# Patient Record
Sex: MALE | Race: WHITE | NOT HISPANIC OR LATINO | Employment: OTHER | URBAN - METROPOLITAN AREA
[De-identification: names, ages, dates, MRNs, and addresses within clinical notes are randomized per-mention and may not be internally consistent; named-entity substitution may affect disease eponyms.]

---

## 2020-06-22 ENCOUNTER — HOSPITAL ENCOUNTER (EMERGENCY)
Facility: HOSPITAL | Age: 30
Discharge: HOME | End: 2020-06-22
Attending: SURGERY
Payer: MEDICAID

## 2020-06-22 VITALS
RESPIRATION RATE: 16 BRPM | WEIGHT: 125 LBS | OXYGEN SATURATION: 98 % | SYSTOLIC BLOOD PRESSURE: 116 MMHG | BODY MASS INDEX: 21.46 KG/M2 | DIASTOLIC BLOOD PRESSURE: 64 MMHG | HEART RATE: 87 BPM | TEMPERATURE: 98 F

## 2020-06-22 DIAGNOSIS — T63.694A MARINE ANIMAL STING, UNDETERMINED INTENT, INITIAL ENCOUNTER: Primary | ICD-10-CM

## 2020-06-22 PROCEDURE — 96372 THER/PROPH/DIAG INJ SC/IM: CPT

## 2020-06-22 PROCEDURE — 25000003 PHARM REV CODE 250: Performed by: SURGERY

## 2020-06-22 PROCEDURE — 99284 EMERGENCY DEPT VISIT MOD MDM: CPT | Mod: 25

## 2020-06-22 RX ORDER — CLINDAMYCIN HYDROCHLORIDE 300 MG/1
300 CAPSULE ORAL 4 TIMES DAILY
Qty: 28 CAPSULE | Refills: 0 | Status: SHIPPED | OUTPATIENT
Start: 2020-06-22 | End: 2020-06-29

## 2020-06-22 RX ORDER — CIPROFLOXACIN 500 MG/1
500 TABLET ORAL 2 TIMES DAILY
Qty: 14 TABLET | Refills: 0 | Status: SHIPPED | OUTPATIENT
Start: 2020-06-22 | End: 2020-06-29

## 2020-06-22 RX ORDER — DOXYCYCLINE 100 MG/1
100 CAPSULE ORAL 2 TIMES DAILY
Qty: 20 CAPSULE | Refills: 0 | Status: SHIPPED | OUTPATIENT
Start: 2020-06-22 | End: 2020-07-02

## 2020-06-22 RX ORDER — MUPIROCIN 20 MG/G
1 OINTMENT TOPICAL
Status: COMPLETED | OUTPATIENT
Start: 2020-06-22 | End: 2020-06-22

## 2020-06-22 RX ORDER — MUPIROCIN 20 MG/G
OINTMENT TOPICAL 3 TIMES DAILY
Qty: 15 G | Refills: 0 | Status: SHIPPED | OUTPATIENT
Start: 2020-06-22 | End: 2020-07-02

## 2020-06-22 RX ORDER — CLINDAMYCIN PHOSPHATE 150 MG/ML
600 INJECTION, SOLUTION INTRAVENOUS
Status: COMPLETED | OUTPATIENT
Start: 2020-06-22 | End: 2020-06-22

## 2020-06-22 RX ADMIN — MUPIROCIN 22 G: 20 OINTMENT TOPICAL at 04:06

## 2020-06-22 RX ADMIN — CLINDAMYCIN PHOSPHATE 600 MG: 150 INJECTION, SOLUTION INTRAMUSCULAR; INTRAVENOUS at 04:06

## 2020-06-22 NOTE — ED PROVIDER NOTES
[facility]                                                 Chief Complaint  29 y.o. male with Finger Injury      History of Present Illness  Enriek Tao presents to the emergency room with CRAB INJURY today  Patient was injured by a crab claw 2 weeks ago on his left thenar eminence  Patient has had induration but no abscess or drainage or fever since that time  Patient on exam has an indurated area, non circumferential, no drainage now  Patient's tetanus status is up-to-date, patient has no systemic signs of infection    The history is provided by the patient   device was not used during this ER visit  Medical history: ADHD, anxiety, depression  Surgeries: Left ankle surgery  Allergies: Amoxicillin    I have reviewed all of this patient's past medical, surgical, family, and social   histories as well as active allergies and medications documented in the  electronic medical record    Review of Systems and Physical Exam      Review of Systems  -- Constitution - no fever, denies fatigue, no weakness, no chills  -- Eyes - no tearing or redness, no visual disturbance  -- Ear, Nose - no tinnitus or earache, no nasal congestion or discharge  -- Mouth,Throat - no sore throat, no toothache, normal voice, normal swallowing  -- Respiratory - denies cough and congestion, no shortness of breath, no NUNEZ  -- Cardiovascular - denies chest pain, no palpitations, denies claudication  -- Gastrointestinal - denies abdominal pain, nausea, vomiting, or diarrhea  -- Genitourinary - no dysuria, denies flank pain, no hematuria, no STD risk  -- Musculoskeletal - denies back pain, negative for trauma or injury  -- Neurological - no headache, denies weakness or seizure; no LOC  -- Skin - left hand infection    Vital Signs  His oral temperature is 98.4 °F (36.9 °C).   His blood pressure is 116/64 and his pulse is 87.   His respiration is 16 and oxygen saturation is 98%.     Physical Exam  -- Nursing  note and vitals reviewed  -- Constitutional: Appears well-developed and well-nourished  -- Head: Atraumatic. Normocephalic. No obvious abnormality  -- Eyes: Pupils are equal and reactive to light. Normal conjunctiva and lids  -- Cardiac: Normal rate, regular rhythm and normal heart sounds  -- Pulmonary: Normal respiratory effort, breath sounds clear to auscultation  -- Abdominal: Soft, no tenderness. Normal bowel sounds. Normal liver edge  -- Musculoskeletal: Normal range of motion, no effusions. Joints stable   -- Neurological: No focal deficits. Showed good interaction with staff  -- Vascular: Posterior tibial, dorsalis pedis and radial pulses 2+ bilaterally    -- Lymphatics: No cervical or peripheral lymphadenopathy. No edema noted  -- Skin: Mild induration to ventral left thenar eminence    Emergency Room Course          Medications Given  mupirocin 2 % ointment 22 g (22 g Topical (Top) Given 6/22/20 1636)   clindamycin injection 600 mg (600 mg Intramuscular Given 6/22/20 1636)     ED Physician Management  -- Diagnosis management comments: 29 y.o. male with Marine sting  -- patient was injured by a crab last month with residual induration  -- no foreign body on evaluation with no abscess noted today here  -- patient has been using over-the-counter ointment with induration  -- no fluctuance or drainage with no signs of cellulitic spread today  -- Cipro doxycycline and clindamycin use for antibiotic coverage  -- local wound care with Bactroban at least 3 to 4 times a day  -- follow-up with hand physician, return to the ER with any concerns    Diagnosis  [B68.041F] Marine animal sting    Disposition and Plan  -- Disposition: home  -- Condition: stable  -- Follow-up: Patient to follow up with MD in 1-2 days.  -- I advised the patient that we have found no life threatening condition today  -- At this time, I believe the patient is clinically stable for discharge.   -- The patient acknowledges that close follow up  with a MD is required   -- Patient agrees to comply with all instruction and direction given in the ER    This note is dictated on M*Modal word recognition program.  There are word recognition mistakes that are occasionally missed on review.         Misha Dimas MD  06/22/20 2424

## 2021-06-05 ENCOUNTER — HOSPITAL ENCOUNTER (EMERGENCY)
Facility: HOSPITAL | Age: 31
Discharge: HOME | End: 2021-06-05
Attending: SURGERY
Payer: MEDICAID

## 2021-06-05 VITALS
DIASTOLIC BLOOD PRESSURE: 76 MMHG | SYSTOLIC BLOOD PRESSURE: 137 MMHG | OXYGEN SATURATION: 99 % | BODY MASS INDEX: 21.46 KG/M2 | TEMPERATURE: 97 F | RESPIRATION RATE: 16 BRPM | HEART RATE: 78 BPM | WEIGHT: 125 LBS

## 2021-06-05 DIAGNOSIS — S67.02XA CRUSHING INJURY OF LEFT THUMB, INITIAL ENCOUNTER: Primary | ICD-10-CM

## 2021-06-05 PROCEDURE — 25000003 PHARM REV CODE 250: Performed by: SURGERY

## 2021-06-05 PROCEDURE — 29125 APPL SHORT ARM SPLINT STATIC: CPT | Mod: LT

## 2021-06-05 PROCEDURE — 99283 EMERGENCY DEPT VISIT LOW MDM: CPT | Mod: 25

## 2021-06-05 RX ORDER — IBUPROFEN 800 MG/1
800 TABLET ORAL EVERY 6 HOURS PRN
Qty: 20 TABLET | Refills: 0 | Status: ON HOLD | OUTPATIENT
Start: 2021-06-05 | End: 2022-06-19 | Stop reason: HOSPADM

## 2021-06-05 RX ORDER — ACETAMINOPHEN 500 MG
1000 TABLET ORAL
Status: COMPLETED | OUTPATIENT
Start: 2021-06-05 | End: 2021-06-05

## 2021-06-05 RX ORDER — IBUPROFEN 800 MG/1
800 TABLET ORAL
Status: COMPLETED | OUTPATIENT
Start: 2021-06-05 | End: 2021-06-05

## 2021-06-05 RX ADMIN — IBUPROFEN 800 MG: 800 TABLET, FILM COATED ORAL at 12:06

## 2021-06-05 RX ADMIN — ACETAMINOPHEN 1000 MG: 500 TABLET ORAL at 12:06

## 2022-06-18 ENCOUNTER — NURSE TRIAGE (OUTPATIENT)
Dept: ADMINISTRATIVE | Facility: CLINIC | Age: 32
End: 2022-06-18
Payer: MEDICAID

## 2022-06-18 ENCOUNTER — HOSPITAL ENCOUNTER (OUTPATIENT)
Facility: HOSPITAL | Age: 32
Discharge: LEFT BEFORE DISCHARGE | End: 2022-06-19
Attending: SURGERY | Admitting: STUDENT IN AN ORGANIZED HEALTH CARE EDUCATION/TRAINING PROGRAM
Payer: MEDICAID

## 2022-06-18 DIAGNOSIS — R11.2 NAUSEA AND VOMITING, INTRACTABILITY OF VOMITING NOT SPECIFIED, UNSPECIFIED VOMITING TYPE: ICD-10-CM

## 2022-06-18 DIAGNOSIS — I95.9 HYPOTENSION, UNSPECIFIED HYPOTENSION TYPE: ICD-10-CM

## 2022-06-18 DIAGNOSIS — A05.9 FOOD POISONING: ICD-10-CM

## 2022-06-18 DIAGNOSIS — K52.9 GASTROENTERITIS: Primary | ICD-10-CM

## 2022-06-18 DIAGNOSIS — U07.1 COVID: ICD-10-CM

## 2022-06-18 DIAGNOSIS — F12.10 MILD TETRAHYDROCANNABINOL (THC) ABUSE: ICD-10-CM

## 2022-06-18 LAB
ALBUMIN SERPL BCP-MCNC: 4.6 G/DL (ref 3.5–5.2)
ALP SERPL-CCNC: 76 U/L (ref 55–135)
ALT SERPL W/O P-5'-P-CCNC: 47 U/L (ref 10–44)
AMORPH CRY URNS QL MICRO: ABNORMAL
AMPHET+METHAMPHET UR QL: NEGATIVE
ANION GAP SERPL CALC-SCNC: 16 MMOL/L (ref 8–16)
AST SERPL-CCNC: 52 U/L (ref 10–40)
BACTERIA #/AREA URNS HPF: ABNORMAL /HPF
BARBITURATES UR QL SCN>200 NG/ML: NEGATIVE
BASOPHILS # BLD AUTO: 0.02 K/UL (ref 0–0.2)
BASOPHILS NFR BLD: 0.2 % (ref 0–1.9)
BENZODIAZ UR QL SCN>200 NG/ML: NEGATIVE
BILIRUB SERPL-MCNC: 0.4 MG/DL (ref 0.1–1)
BILIRUB UR QL STRIP: NEGATIVE
BUN SERPL-MCNC: 21 MG/DL (ref 6–20)
BZE UR QL SCN: NEGATIVE
CALCIUM SERPL-MCNC: 9.5 MG/DL (ref 8.7–10.5)
CANNABINOIDS UR QL SCN: ABNORMAL
CAOX CRY URNS QL MICRO: ABNORMAL
CHLORIDE SERPL-SCNC: 99 MMOL/L (ref 95–110)
CLARITY UR: CLEAR
CO2 SERPL-SCNC: 22 MMOL/L (ref 23–29)
COLOR UR: YELLOW
CREAT SERPL-MCNC: 1.6 MG/DL (ref 0.5–1.4)
CREAT UR-MCNC: 380.7 MG/DL (ref 23–375)
DIFFERENTIAL METHOD: ABNORMAL
EOSINOPHIL # BLD AUTO: 0.2 K/UL (ref 0–0.5)
EOSINOPHIL NFR BLD: 2.6 % (ref 0–8)
ERYTHROCYTE [DISTWIDTH] IN BLOOD BY AUTOMATED COUNT: 12 % (ref 11.5–14.5)
EST. GFR  (AFRICAN AMERICAN): >60 ML/MIN/1.73 M^2
EST. GFR  (NON AFRICAN AMERICAN): 56 ML/MIN/1.73 M^2
GLUCOSE SERPL-MCNC: 110 MG/DL (ref 70–110)
GLUCOSE UR QL STRIP: NEGATIVE
HCT VFR BLD AUTO: 51.4 % (ref 40–54)
HGB BLD-MCNC: 18.3 G/DL (ref 14–18)
HGB UR QL STRIP: ABNORMAL
HYALINE CASTS #/AREA URNS LPF: 100 /LPF
IMM GRANULOCYTES # BLD AUTO: 0.03 K/UL (ref 0–0.04)
IMM GRANULOCYTES NFR BLD AUTO: 0.4 % (ref 0–0.5)
KETONES UR QL STRIP: NEGATIVE
LACTATE SERPL-SCNC: 0.8 MMOL/L (ref 0.5–2.2)
LACTATE SERPL-SCNC: 1.4 MMOL/L (ref 0.5–2.2)
LEUKOCYTE ESTERASE UR QL STRIP: NEGATIVE
LIPASE SERPL-CCNC: 26 U/L (ref 4–60)
LYMPHOCYTES # BLD AUTO: 0.7 K/UL (ref 1–4.8)
LYMPHOCYTES NFR BLD: 8.4 % (ref 18–48)
MCH RBC QN AUTO: 31.7 PG (ref 27–31)
MCHC RBC AUTO-ENTMCNC: 35.6 G/DL (ref 32–36)
MCV RBC AUTO: 89 FL (ref 82–98)
METHADONE UR QL SCN>300 NG/ML: NEGATIVE
MICROSCOPIC COMMENT: ABNORMAL
MONOCYTES # BLD AUTO: 0.8 K/UL (ref 0.3–1)
MONOCYTES NFR BLD: 10.2 % (ref 4–15)
NEUTROPHILS # BLD AUTO: 6.3 K/UL (ref 1.8–7.7)
NEUTROPHILS NFR BLD: 78.2 % (ref 38–73)
NITRITE UR QL STRIP: NEGATIVE
NRBC BLD-RTO: 0 /100 WBC
OPIATES UR QL SCN: NEGATIVE
PCP UR QL SCN>25 NG/ML: NEGATIVE
PH UR STRIP: 6 [PH] (ref 5–8)
PLATELET # BLD AUTO: 168 K/UL (ref 150–450)
PMV BLD AUTO: 11 FL (ref 9.2–12.9)
POTASSIUM SERPL-SCNC: 3.8 MMOL/L (ref 3.5–5.1)
PROT SERPL-MCNC: 8.3 G/DL (ref 6–8.4)
PROT UR QL STRIP: ABNORMAL
RBC # BLD AUTO: 5.78 M/UL (ref 4.6–6.2)
RBC #/AREA URNS HPF: 3 /HPF (ref 0–4)
SODIUM SERPL-SCNC: 137 MMOL/L (ref 136–145)
SP GR UR STRIP: >=1.03 (ref 1–1.03)
TOXICOLOGY INFORMATION: ABNORMAL
URN SPEC COLLECT METH UR: ABNORMAL
UROBILINOGEN UR STRIP-ACNC: NEGATIVE EU/DL
WBC # BLD AUTO: 8.01 K/UL (ref 3.9–12.7)
WBC #/AREA URNS HPF: 6 /HPF (ref 0–5)

## 2022-06-18 PROCEDURE — 36415 COLL VENOUS BLD VENIPUNCTURE: CPT | Performed by: SURGERY

## 2022-06-18 PROCEDURE — 83605 ASSAY OF LACTIC ACID: CPT | Mod: 91 | Performed by: SURGERY

## 2022-06-18 PROCEDURE — 85025 COMPLETE CBC W/AUTO DIFF WBC: CPT | Performed by: SURGERY

## 2022-06-18 PROCEDURE — 63600175 PHARM REV CODE 636 W HCPCS: Performed by: SURGERY

## 2022-06-18 PROCEDURE — 96361 HYDRATE IV INFUSION ADD-ON: CPT

## 2022-06-18 PROCEDURE — 80053 COMPREHEN METABOLIC PANEL: CPT | Performed by: SURGERY

## 2022-06-18 PROCEDURE — 99285 EMERGENCY DEPT VISIT HI MDM: CPT | Mod: 25

## 2022-06-18 PROCEDURE — 83690 ASSAY OF LIPASE: CPT | Performed by: SURGERY

## 2022-06-18 PROCEDURE — 87040 BLOOD CULTURE FOR BACTERIA: CPT | Mod: 59 | Performed by: SURGERY

## 2022-06-18 PROCEDURE — G0378 HOSPITAL OBSERVATION PER HR: HCPCS

## 2022-06-18 PROCEDURE — 81000 URINALYSIS NONAUTO W/SCOPE: CPT | Mod: 59 | Performed by: SURGERY

## 2022-06-18 PROCEDURE — 25000003 PHARM REV CODE 250: Performed by: SURGERY

## 2022-06-18 PROCEDURE — 96374 THER/PROPH/DIAG INJ IV PUSH: CPT

## 2022-06-18 PROCEDURE — 80307 DRUG TEST PRSMV CHEM ANLYZR: CPT | Performed by: SURGERY

## 2022-06-18 RX ORDER — ONDANSETRON 2 MG/ML
4 INJECTION INTRAMUSCULAR; INTRAVENOUS
Status: COMPLETED | OUTPATIENT
Start: 2022-06-18 | End: 2022-06-18

## 2022-06-18 RX ADMIN — ONDANSETRON 4 MG: 2 INJECTION INTRAMUSCULAR; INTRAVENOUS at 07:06

## 2022-06-18 RX ADMIN — SODIUM CHLORIDE 1000 ML: 0.9 SOLUTION INTRAVENOUS at 09:06

## 2022-06-18 RX ADMIN — SODIUM CHLORIDE 1000 ML: 0.9 INJECTION, SOLUTION INTRAVENOUS at 07:06

## 2022-06-18 NOTE — TELEPHONE ENCOUNTER
Reason for Disposition   Nursing judgment    Additional Information   Negative: Shock suspected (e.g., cold/pale/clammy skin, too weak to stand, low BP, rapid pulse)   Negative: Difficult to awaken or acting confused (e.g., disoriented, slurred speech)   Negative: Sounds like a life-threatening emergency to the triager   Negative: Vomiting occurs only while coughing   Negative: [1] Pregnant < 20 Weeks AND [2] nausea/vomiting began in early pregnancy (i.e., 4-8 weeks pregnant)   Negative: Chest pain   Negative: Headache is main symptom   Negative: Vomiting (or Nausea) in a cancer patient who is currently (or recently) receiving chemotherapy or radiation therapy, or cancer patient who has metastatic or end-stage cancer and is receiving palliative care    Protocols used: NO GUIDELINE OR REFERENCE [id number], VOMITING-A-AH    Pt's s/o stated he has being vomiting since Thursday. Stated she thinks he ate bad potatoes. Pt cannot keep anything down and feels weak. Advised to bring to the ED now for evaluation.

## 2022-06-19 VITALS
OXYGEN SATURATION: 98 % | HEART RATE: 82 BPM | BODY MASS INDEX: 21.94 KG/M2 | HEIGHT: 64 IN | RESPIRATION RATE: 16 BRPM | DIASTOLIC BLOOD PRESSURE: 55 MMHG | TEMPERATURE: 97 F | WEIGHT: 128.5 LBS | SYSTOLIC BLOOD PRESSURE: 102 MMHG

## 2022-06-19 PROBLEM — U07.1 COVID: Status: ACTIVE | Noted: 2022-06-19

## 2022-06-19 PROBLEM — R11.2 NAUSEA AND VOMITING: Status: ACTIVE | Noted: 2022-06-19

## 2022-06-19 PROBLEM — K52.9 GASTROENTERITIS: Status: ACTIVE | Noted: 2022-06-19

## 2022-06-19 LAB — SARS-COV-2 RDRP RESP QL NAA+PROBE: POSITIVE

## 2022-06-19 PROCEDURE — U0002 COVID-19 LAB TEST NON-CDC: HCPCS | Performed by: SURGERY

## 2022-06-19 PROCEDURE — G0378 HOSPITAL OBSERVATION PER HR: HCPCS

## 2022-06-19 PROCEDURE — 25000003 PHARM REV CODE 250: Performed by: SURGERY

## 2022-06-19 RX ORDER — ACETAMINOPHEN 325 MG/1
650 TABLET ORAL EVERY 8 HOURS PRN
Status: DISCONTINUED | OUTPATIENT
Start: 2022-06-19 | End: 2022-06-19 | Stop reason: HOSPADM

## 2022-06-19 RX ORDER — ONDANSETRON 2 MG/ML
4 INJECTION INTRAMUSCULAR; INTRAVENOUS EVERY 8 HOURS PRN
Status: DISCONTINUED | OUTPATIENT
Start: 2022-06-19 | End: 2022-06-19 | Stop reason: HOSPADM

## 2022-06-19 RX ORDER — SODIUM CHLORIDE 0.9 % (FLUSH) 0.9 %
10 SYRINGE (ML) INJECTION
Status: DISCONTINUED | OUTPATIENT
Start: 2022-06-19 | End: 2022-06-19 | Stop reason: HOSPADM

## 2022-06-19 RX ORDER — TALC
6 POWDER (GRAM) TOPICAL NIGHTLY PRN
Status: DISCONTINUED | OUTPATIENT
Start: 2022-06-19 | End: 2022-06-19 | Stop reason: HOSPADM

## 2022-06-19 RX ADMIN — ACETAMINOPHEN 650 MG: 325 TABLET ORAL at 02:06

## 2022-06-19 NOTE — SUBJECTIVE & OBJECTIVE
Past Medical History:   Diagnosis Date    ADHD (attention deficit hyperactivity disorder)     Anxiety     Depression        Past Surgical History:   Procedure Laterality Date    ANKLE SURGERY      left       Review of patient's allergies indicates:   Allergen Reactions    Amoxicillin Hives       No current facility-administered medications on file prior to encounter.     Current Outpatient Medications on File Prior to Encounter   Medication Sig    ibuprofen (ADVIL,MOTRIN) 800 MG tablet Take 1 tablet (800 mg total) by mouth every 6 (six) hours as needed for Pain.     Family History       Problem Relation (Age of Onset)    Alcohol abuse Father    Cancer Maternal Grandmother, Maternal Grandfather    Diabetes Father, Paternal Grandmother, Paternal Grandfather    Hypertension Father, Paternal Grandmother, Paternal Grandfather          Tobacco Use    Smoking status: Current Every Day Smoker     Packs/day: 2.00     Years: 10.00     Pack years: 20.00     Last attempt to quit: 11/10/2012     Years since quittin.6    Smokeless tobacco: Never Used   Substance and Sexual Activity    Alcohol use: No    Drug use: No    Sexual activity: Yes     Partners: Female     Review of Systems   Unable to perform ROS: Other  left AMA  Objective:     Vital Signs (Most Recent):  Temp: 97.2 °F (36.2 °C) (22 0804)  Pulse: 82 (22 1000)  Resp: 16 (22 0804)  BP: (!) 102/55 (22 0809)  SpO2: 98 % (22 0804)   Vital Signs (24h Range):  Temp:  [97.2 °F (36.2 °C)-99.3 °F (37.4 °C)] 97.2 °F (36.2 °C)  Pulse:  [] 82  Resp:  [16-18] 16  SpO2:  [95 %-100 %] 98 %  BP: ()/(55-77) 102/55     Weight: 58.3 kg (128 lb 8.5 oz)  Body mass index is 22.06 kg/m².    Physical Exam  Nursing note reviewed: left AMA.           Significant Labs: All pertinent labs within the past 24 hours have been reviewed.  Blood Culture:   Recent Labs   Lab 22  4390   LABBLOO No Growth to date  No Growth to date     BMP:   Recent Labs    Lab 06/18/22  1855         K 3.8   CL 99   CO2 22*   BUN 21*   CREATININE 1.6*   CALCIUM 9.5     CBC:   Recent Labs   Lab 06/18/22  1855   WBC 8.01   HGB 18.3*   HCT 51.4        CMP:   Recent Labs   Lab 06/18/22 1855      K 3.8   CL 99   CO2 22*      BUN 21*   CREATININE 1.6*   CALCIUM 9.5   PROT 8.3   ALBUMIN 4.6   BILITOT 0.4   ALKPHOS 76   AST 52*   ALT 47*   ANIONGAP 16   EGFRNONAA 56*     Lactic Acid:   Recent Labs   Lab 06/18/22 1855 06/18/22  2245   LACTATE 1.4 0.8     Lipase:   Recent Labs   Lab 06/18/22 1855   LIPASE 26     TSH: No results for input(s): TSH in the last 4320 hours.  Urine Studies:   Recent Labs   Lab 06/18/22  1906   COLORU Yellow   APPEARANCEUA Clear   PHUR 6.0   SPECGRAV >=1.030*   PROTEINUA 1+*   GLUCUA Negative   KETONESU Negative   BILIRUBINUA Negative   OCCULTUA 1+*   NITRITE Negative   UROBILINOGEN Negative   LEUKOCYTESUR Negative   RBCUA 3   WBCUA 6*   BACTERIA Few*   HYALINECASTS 100*       Significant Imaging: I have reviewed all pertinent imaging results/findings within the past 24 hours.

## 2022-06-19 NOTE — HPI
Pt refused morning labs, tele, and SCDs. He requested to leave ama per nursing this morning prior to being seen.

## 2022-06-19 NOTE — H&P
[facility] ([address] [place] [address] Medicine  History & Physical    Patient Name: Enrike Tao  MRN: [id number]  Patient Class: OP- Observation  Admission Date: 2022  Attending Physician: Andrew Baptiste MD   Primary Care Provider: Primary Doctor No         Patient information was obtained from ER records and left AMA prior to being seen.     Subjective:     Principal Problem:COVID    Chief Complaint:   Chief Complaint   Patient presents with    Emesis     Patient has been vomiting since Thursday, patient believes he is sick from eating potatoes         HPI: Pt refused morning labs, tele, and SCDs. He requested to leave ama per nursing this morning prior to being seen.      Past Medical History:   Diagnosis Date    ADHD (attention deficit hyperactivity disorder)     Anxiety     Depression        Past Surgical History:   Procedure Laterality Date    ANKLE SURGERY      left       Review of patient's allergies indicates:   Allergen Reactions    Amoxicillin Hives       No current facility-administered medications on file prior to encounter.     Current Outpatient Medications on File Prior to Encounter   Medication Sig    ibuprofen (ADVIL,MOTRIN) 800 MG tablet Take 1 tablet (800 mg total) by mouth every 6 (six) hours as needed for Pain.     Family History       Problem Relation (Age of Onset)    Alcohol abuse Father    Cancer Maternal Grandmother, Maternal Grandfather    Diabetes Father, Paternal Grandmother, Paternal Grandfather    Hypertension Father, Paternal Grandmother, Paternal Grandfather          Tobacco Use    Smoking status: Current Every Day Smoker     Packs/day: 2.00     Years: 10.00     Pack years: 20.00     Last attempt to quit: 11/10/2012     Years since quittin.6    Smokeless tobacco: Never Used   Substance and Sexual Activity    Alcohol use: No    Drug use: No    Sexual activity: Yes     Partners: Female     Review of Systems   Unable to perform ROS: Other  left AMA  Objective:      Vital Signs (Most Recent):  Temp: 97.2 °F (36.2 °C) (06/19/22 0804)  Pulse: 82 (06/19/22 1000)  Resp: 16 (06/19/22 0804)  BP: (!) 102/55 (06/19/22 0809)  SpO2: 98 % (06/19/22 0804)   Vital Signs (24h Range):  Temp:  [97.2 °F (36.2 °C)-99.3 °F (37.4 °C)] 97.2 °F (36.2 °C)  Pulse:  [] 82  Resp:  [16-18] 16  SpO2:  [95 %-100 %] 98 %  BP: ()/(55-77) 102/55     Weight: 58.3 kg (128 lb 8.5 oz)  Body mass index is 22.06 kg/m².    Physical Exam  Nursing note reviewed: left AMA.           Significant Labs: All pertinent labs within the past 24 hours have been reviewed.  Blood Culture:   Recent Labs   Lab 06/18/22 1855   LABBLOO No Growth to date  No Growth to date     BMP:   Recent Labs   Lab 06/18/22 1855         K 3.8   CL 99   CO2 22*   BUN 21*   CREATININE 1.6*   CALCIUM 9.5     CBC:   Recent Labs   Lab 06/18/22 1855   WBC 8.01   HGB 18.3*   HCT 51.4        CMP:   Recent Labs   Lab 06/18/22 1855      K 3.8   CL 99   CO2 22*      BUN 21*   CREATININE 1.6*   CALCIUM 9.5   PROT 8.3   ALBUMIN 4.6   BILITOT 0.4   ALKPHOS 76   AST 52*   ALT 47*   ANIONGAP 16   EGFRNONAA 56*     Lactic Acid:   Recent Labs   Lab 06/18/22 1855 06/18/22  2245   LACTATE 1.4 0.8     Lipase:   Recent Labs   Lab 06/18/22 1855   LIPASE 26     TSH: No results for input(s): TSH in the last 4320 hours.  Urine Studies:   Recent Labs   Lab 06/18/22 1906   COLORU Yellow   APPEARANCEUA Clear   PHUR 6.0   SPECGRAV >=1.030*   PROTEINUA 1+*   GLUCUA Negative   KETONESU Negative   BILIRUBINUA Negative   OCCULTUA 1+*   NITRITE Negative   UROBILINOGEN Negative   LEUKOCYTESUR Negative   RBCUA 3   WBCUA 6*   BACTERIA Few*   HYALINECASTS 100*       Significant Imaging: I have reviewed all pertinent imaging results/findings within the past 24 hours.    Assessment/Plan:     * COVID  Left AMA prior to being seen      Nausea and vomiting  Left AMA prior to being seen      Gastroenteritis  Left AMA prior to being  seen      VTE Risk Mitigation (From admission, onward)         Ordered     IP VTE LOW RISK PATIENT  Once         06/19/22 0138     Place sequential compression device  Until discontinued         06/19/22 0138                   Andrew Baptiste MD  Department of Hospital Medicine   [facility] (3rd Fl)

## 2022-06-19 NOTE — NURSING
Pt up from ed via w/c pt positioned self in bed oriented pt and girlfriend to room and surroundings vss call light in reach report from charo reilly rn plan of care reviewed with pt understanding verbalized

## 2022-06-19 NOTE — PLAN OF CARE
[facility] - [facility] (3rd Fl)  Initial Discharge Assessment       Primary Care Provider: Primary Doctor No    Admission Diagnosis: Gastroenteritis [K52.9]  Food poisoning [A05.9]  Mild tetrahydrocannabinol (THC) abuse [F12.10]  Hypotension, unspecified hypotension type [I95.9]  Nausea and vomiting, intractability of vomiting not specified, unspecified vomiting type [R11.2]  COVID [U07.1]    Admission Date: 6/18/2022  Expected Discharge Date:     Discharge Barriers Identified: None    Payor: MEDICAID / Plan: [place] [facility] / Product Type: Managed Medicaid /     Extended Emergency Contact Information  Primary Emergency Contact: Corrina Tao  Address: [address] [place] [address] [facility]  Home Phone: [phone]  Relation: Mother    Discharge Plan A: Home, Home with family  Discharge Plan B: Home, Home with family      [facility] [address] [place] [address] [facility] [address] [facility] [address]  [facility] [address]  Phone: [phone] Fax: [phone]      Initial Assessment (most recent)     Adult Discharge Assessment - 06/19/22 0812        Discharge Assessment    Assessment Type Discharge Planning Assessment     Confirmed/corrected address, phone number and insurance Yes     Confirmed Demographics Correct on Facesheet     Source of Information patient     Reason For Admission Gastroenteritis, Food Poison, Mild tetrahydrocannabinol (THC) abuse, COVID     Lives With significant other     Do you expect to return to your current living situation? Yes     Do you have help at home or someone to help you manage your care at home? Yes     Who are your caregiver(s) and their phone number(s)? Mild tetrahydrocannabinol (THC) abuse     Prior to hospitilization cognitive status: Alert/Oriented     Current cognitive status: Alert/Oriented     Walking or Climbing Stairs Difficulty none     Dressing/Bathing Difficulty none     Home Layout Able to live on 1st floor     Equipment Currently Used at Home none      Readmission within 30 days? No     Patient currently being followed by outpatient case management? No     Do you currently have service(s) that help you manage your care at home? No     Do you take prescription medications? Yes     Do you have prescription coverage? Yes     Do you have any problems affording any of your prescribed medications? No     Is the patient taking medications as prescribed? yes     Who is going to help you get home at discharge? significant other     How do you get to doctors appointments? family or friend will provide;car, drives self     Are you on dialysis? No     Do you take coumadin? No     Discharge Plan A Home;Home with family     Discharge Plan B Home;Home with family     DME Needed Upon Discharge  none     Discharge Plan discussed with: Patient     Discharge Barriers Identified None        Relationship/Environment    Name(s) of Who Lives With Patient significant other                Discharge assessment completed.  Patient does not have any  needs at this time. Will follow as needed.

## 2022-06-19 NOTE — ED NOTES
Pt reports feeling much better.  Denies c/o nausea at this time.  Reports drank a cup of water and ate some ice without any difficulty.

## 2022-06-19 NOTE — DISCHARGE SUMMARY
[facility] ([facility])  Hospital Medicine  Discharge Summary      Patient Name: Enrike Tao  MRN: [id number]  Patient Class: OP- Observation  Admission Date: 6/18/2022  Hospital Length of Stay: 0 days  Discharge Date and Time: No discharge date for patient encounter.  Attending Physician: Andrew Baptiste MD   Discharging Provider: Andrew Baptiste MD  Primary Care Provider: Primary Doctor No      HPI:   Pt refused morning labs, tele, and SCDs. He requested to leave ama per nursing this morning prior to being seen.      * No surgery found *      Hospital Course:   No notes on file     Goals of Care Treatment Preferences:  Code Status: Full Code      Consults:     No new Assessment & Plan notes have been filed under this hospital service since the last note was generated.  Service: Hospital Medicine    Final Active Diagnoses:    Diagnosis Date Noted POA    PRINCIPAL PROBLEM:  COVID [U07.1] 06/19/2022 Yes    Gastroenteritis [K52.9] 06/19/2022 Yes    Nausea and vomiting [R11.2] 06/19/2022 Yes      Problems Resolved During this Admission:       Discharged Condition: against medical advice    Disposition: Left Against Medical Adv*    Follow Up:   Follow-up Information     Primary Doctor No. Schedule an appointment as soon as possible for a visit in 1 week(s).                     Patient Instructions:      Notify your health care provider if you experience any of the following:  temperature >100.4     Notify your health care provider if you experience any of the following:  persistent nausea and vomiting or diarrhea     Notify your health care provider if you experience any of the following:  severe uncontrolled pain     Notify your health care provider if you experience any of the following:  increased confusion or weakness       Significant Diagnostic Studies: Labs:   BMP:   Recent Labs   Lab 06/18/22  1855         K 3.8   CL 99   CO2 22*   BUN 21*   CREATININE 1.6*   CALCIUM 9.5   , CMP   Recent  Labs   Lab 06/18/22  1855      K 3.8   CL 99   CO2 22*      BUN 21*   CREATININE 1.6*   CALCIUM 9.5   PROT 8.3   ALBUMIN 4.6   BILITOT 0.4   ALKPHOS 76   AST 52*   ALT 47*   ANIONGAP 16   ESTGFRAFRICA >60   EGFRNONAA 56*    and CBC   Recent Labs   Lab 06/18/22  1855   WBC 8.01   HGB 18.3*   HCT 51.4          Pending Diagnostic Studies:     None         Medications:  Reconciled Home Medications:      Medication List      STOP taking these medications    ibuprofen 800 MG tablet  Commonly known as: ADVIL,MOTRIN            Indwelling Lines/Drains at time of discharge:   Lines/Drains/Airways     None                 Time spent on the discharge of patient: 20 minutes         Andrew Baptiste MD  Department of Hospital Medicine  [facility] (3rd Fl)

## 2022-06-19 NOTE — ED PROVIDER NOTES
Encounter Date: 2022       History     Chief Complaint   Patient presents with    Emesis     Patient has been vomiting since Thursday, patient believes he is sick from eating potatoes      31-year-old male presents with nausea vomiting after eating potatoes 4 days ago  Patient states he has been 6 this stomach with nausea vomiting since eating this  Patient denies any abdominal pain patient denies any fever, no distress noted  Patient denies any were diarrhea, no blood diarrhea, no recent travel history noted  Patient tested positive for COVID tonight, patient was unaware there is COVID positive        Review of patient's allergies indicates:   Allergen Reactions    Amoxicillin Hives     Past Medical History:   Diagnosis Date    ADHD (attention deficit hyperactivity disorder)     Anxiety     Depression      Past Surgical History:   Procedure Laterality Date    ANKLE SURGERY      left     Family History   Problem Relation Age of Onset    Hypertension Father     Diabetes Father     Alcohol abuse Father     Cancer Maternal Grandmother     Cancer Maternal Grandfather     Hypertension Paternal Grandmother     Diabetes Paternal Grandmother     Hypertension Paternal Grandfather     Diabetes Paternal Grandfather      Social History     Tobacco Use    Smoking status: Current Every Day Smoker     Packs/day: 2.00     Years: 10.00     Pack years: 20.00     Last attempt to quit: 11/10/2012     Years since quittin.6    Smokeless tobacco: Never Used   Substance Use Topics    Alcohol use: No    Drug use: No     Review of Systems   Constitutional: Negative for activity change, appetite change, fatigue, fever and unexpected weight change.   HENT: Negative for congestion, ear pain, mouth sores, nosebleeds, rhinorrhea, sinus pressure, sneezing and sore throat.    Eyes: Negative for pain, discharge, redness and itching.   Respiratory: Negative for apnea, cough, chest tightness and shortness of breath.     Cardiovascular: Negative for chest pain, palpitations and leg swelling.   Gastrointestinal: Positive for nausea and vomiting. Negative for abdominal distention, abdominal pain, anal bleeding, constipation and diarrhea.   Endocrine: Negative.    Genitourinary: Negative for dysuria, enuresis, flank pain and frequency.   Musculoskeletal: Negative for arthralgias, back pain, neck pain and neck stiffness.   Skin: Negative for color change and wound.   Allergic/Immunologic: Negative.    Neurological: Negative for dizziness, tremors, syncope, facial asymmetry, speech difficulty, weakness, light-headedness, numbness and headaches.   Hematological: Negative for adenopathy. Does not bruise/bleed easily.   Psychiatric/Behavioral: Negative for agitation, behavioral problems, hallucinations, self-injury and suicidal ideas. The patient is not nervous/anxious.        Physical Exam     Initial Vitals [06/18/22 1844]   BP Pulse Resp Temp SpO2   107/71 103 18 99.3 °F (37.4 °C) 97 %      MAP       --         Physical Exam    Constitutional: Vital signs are normal. He appears well-developed and well-nourished. He is cooperative.   HENT:   Head: Normocephalic and atraumatic.   Right Ear: Hearing, tympanic membrane, external ear and ear canal normal.   Left Ear: Hearing, tympanic membrane, external ear and ear canal normal.   Nose: Nose normal.   Mouth/Throat: Uvula is midline, oropharynx is clear and moist and mucous membranes are normal.   Eyes: Conjunctivae, EOM and lids are normal. Pupils are equal, round, and reactive to light.   Neck: Neck supple. No JVD present.   Normal range of motion.   Full passive range of motion without pain.     Cardiovascular: Normal rate, regular rhythm, S1 normal, S2 normal, normal heart sounds, intact distal pulses and normal pulses.   Pulmonary/Chest: Effort normal and breath sounds normal.   Abdominal: Abdomen is soft and flat. Bowel sounds are normal.   Musculoskeletal:         General: Normal  range of motion.      Cervical back: Full passive range of motion without pain, normal range of motion and neck supple.     Neurological: He is alert and oriented to person, place, and time. He has normal strength.   Skin: Skin is warm, dry and intact. Capillary refill takes less than 2 seconds.         ED Course   Procedures  Labs Reviewed   COMPREHENSIVE METABOLIC PANEL - Abnormal; Notable for the following components:       Result Value    CO2 22 (*)     BUN 21 (*)     Creatinine 1.6 (*)     AST 52 (*)     ALT 47 (*)     eGFR if non  56 (*)     All other components within normal limits   CBC W/ AUTO DIFFERENTIAL - Abnormal; Notable for the following components:    Hemoglobin 18.3 (*)     MCH 31.7 (*)     Lymph # 0.7 (*)     Gran % 78.2 (*)     Lymph % 8.4 (*)     All other components within normal limits   URINALYSIS, REFLEX TO URINE CULTURE - Abnormal; Notable for the following components:    Specific Gravity, UA >=1.030 (*)     Protein, UA 1+ (*)     Occult Blood UA 1+ (*)     All other components within normal limits    Narrative:     Specimen Source->Urine   DRUG SCREEN PANEL, URINE EMERGENCY - Abnormal; Notable for the following components:    THC Presumptive Positive (*)     Creatinine, Urine 380.7 (*)     All other components within normal limits    Narrative:     Specimen Source->Urine   URINALYSIS MICROSCOPIC - Abnormal; Notable for the following components:    WBC, UA 6 (*)     Bacteria Few (*)     Hyaline Casts,  (*)     All other components within normal limits    Narrative:     Specimen Source->Urine   SARS-COV-2 RNA AMPLIFICATION, QUAL - Abnormal; Notable for the following components:    SARS-CoV-2 RNA, Amplification, Qual Positive (*)     All other components within normal limits   CULTURE, BLOOD   CULTURE, BLOOD   LIPASE   LACTIC ACID, PLASMA   LACTIC ACID, PLASMA   LACTIC ACID, PLASMA          Imaging Results          X-Ray Abdomen Flat And Erect (Final result)  Result time  06/18/22 20:20:05    Final result by Noel Aponte MD (06/18/22 20:20:05)                 Impression:      Mild gaseous distention of the bowel.    Possible mild hepatosplenomegaly.      Electronically signed by: Noel Aponte  Date:    06/18/2022  Time:    20:20             Narrative:    EXAMINATION:  XR ABDOMEN FLAT AND ERECT    CLINICAL HISTORY:  Nausea (787.02);    TECHNIQUE:  Flat and erect AP views of the abdomen were performed.    COMPARISON:  None    FINDINGS:  No radiographic mass or pathologic calcification.  Mild prominence of the hepatic and splenic silhouette.    No evidence of bowel obstruction.  Mild gaseous distention the bowel.  No free air is detected.  No acute osseous abnormality.                                 Medications   sodium chloride 0.9% bolus 1,000 mL (0 mLs Intravenous Stopped 6/18/22 2217)   ondansetron injection 4 mg (4 mg Intravenous Given 6/18/22 1928)   sodium chloride 0.9% bolus 1,000 mL (0 mLs Intravenous Stopped 6/18/22 2218)     Medical Decision Making:   Initial Assessment:   Nausea vomiting with no diarrhea after eating potatoes this week  Patient thinks that he had food poisoning, does not look toxic on exam  Mildly hypotensive with low-grade temperature, patient is actually COVID positive    Differential Diagnosis:   Flu, strep, gastritis, gastroenteritis, food poisoning, COVID, dehydration NOS    Clinical Tests:   Lab Tests: Ordered and Reviewed  Radiological Study: Ordered and Reviewed    ED Management:  Patient presented with nausea vomiting history since Thursday for 4 days now  Patient was stable lab work in the emergency room, patient actually COVID positive  Patient has no symptoms of COVID other than nausea vomiting, not febrile on ER  Patient is slightly hypotensive, will admit overnight with IV fluids and reassessment                       Clinical Impression:   Final diagnoses:  [R11.2] Nausea and vomiting, intractability of vomiting not specified,  unspecified vomiting type (Primary)  [A05.9] Food poisoning  [K52.9] Gastroenteritis  [I95.9] Hypotension, unspecified hypotension type          ED Disposition Condition    Observation               Misha Dimas MD  06/19/22 0137

## 2022-06-23 LAB
BACTERIA BLD CULT: NORMAL
BACTERIA BLD CULT: NORMAL

## 2024-08-19 ENCOUNTER — HOSPITAL ENCOUNTER (INPATIENT)
Facility: HOSPITAL | Age: 34
LOS: 2 days | Discharge: HOME | DRG: 885 | End: 2024-08-21
Attending: PSYCHIATRY & NEUROLOGY | Admitting: PSYCHIATRY & NEUROLOGY
Payer: MEDICAID

## 2024-08-19 DIAGNOSIS — F29 PSYCHOSIS, UNSPECIFIED PSYCHOSIS TYPE: Primary | ICD-10-CM

## 2024-08-19 DIAGNOSIS — F29 PSYCHOSIS: ICD-10-CM

## 2024-08-19 DIAGNOSIS — F32.A DEPRESSION, UNSPECIFIED DEPRESSION TYPE: ICD-10-CM

## 2024-08-19 PROBLEM — F12.90 MARIJUANA USE: Status: ACTIVE | Noted: 2024-08-19

## 2024-08-19 PROCEDURE — 99231 SBSQ HOSP IP/OBS SF/LOW 25: CPT | Mod: SA,HB,, | Performed by: PHYSICIAN ASSISTANT

## 2024-08-19 PROCEDURE — 11400000 HC PSYCH PRIVATE ROOM

## 2024-08-19 PROCEDURE — 99222 1ST HOSP IP/OBS MODERATE 55: CPT | Mod: ,,, | Performed by: STUDENT IN AN ORGANIZED HEALTH CARE EDUCATION/TRAINING PROGRAM

## 2024-08-19 PROCEDURE — 25000003 PHARM REV CODE 250: Performed by: STUDENT IN AN ORGANIZED HEALTH CARE EDUCATION/TRAINING PROGRAM

## 2024-08-19 PROCEDURE — S4991 NICOTINE PATCH NONLEGEND: HCPCS | Performed by: PSYCHIATRY & NEUROLOGY

## 2024-08-19 PROCEDURE — 25000003 PHARM REV CODE 250: Performed by: PSYCHIATRY & NEUROLOGY

## 2024-08-19 RX ORDER — OLANZAPINE 10 MG/2ML
10 INJECTION, POWDER, FOR SOLUTION INTRAMUSCULAR EVERY 8 HOURS PRN
Status: DISCONTINUED | OUTPATIENT
Start: 2024-08-19 | End: 2024-08-21 | Stop reason: HOSPADM

## 2024-08-19 RX ORDER — IBUPROFEN 200 MG
1 TABLET ORAL DAILY
Status: DISCONTINUED | OUTPATIENT
Start: 2024-08-19 | End: 2024-08-21 | Stop reason: HOSPADM

## 2024-08-19 RX ORDER — BENZTROPINE MESYLATE 1 MG/ML
2 INJECTION, SOLUTION INTRAMUSCULAR; INTRAVENOUS EVERY 8 HOURS PRN
Status: DISCONTINUED | OUTPATIENT
Start: 2024-08-19 | End: 2024-08-21 | Stop reason: HOSPADM

## 2024-08-19 RX ORDER — HYDROXYZINE PAMOATE 50 MG/1
50 CAPSULE ORAL EVERY 6 HOURS PRN
Status: DISCONTINUED | OUTPATIENT
Start: 2024-08-19 | End: 2024-08-21 | Stop reason: HOSPADM

## 2024-08-19 RX ORDER — IBUPROFEN 200 MG
1 TABLET ORAL DAILY
Status: DISCONTINUED | OUTPATIENT
Start: 2024-08-19 | End: 2024-08-19

## 2024-08-19 RX ORDER — IBUPROFEN 200 MG
1 TABLET ORAL DAILY PRN
Status: DISCONTINUED | OUTPATIENT
Start: 2024-08-19 | End: 2024-08-21 | Stop reason: HOSPADM

## 2024-08-19 RX ORDER — QUETIAPINE FUMARATE 25 MG/1
50 TABLET, FILM COATED ORAL NIGHTLY
Status: DISCONTINUED | OUTPATIENT
Start: 2024-08-19 | End: 2024-08-20

## 2024-08-19 RX ORDER — OLANZAPINE 10 MG/1
10 TABLET ORAL EVERY 8 HOURS PRN
Status: DISCONTINUED | OUTPATIENT
Start: 2024-08-19 | End: 2024-08-19

## 2024-08-19 RX ORDER — OLANZAPINE 10 MG/2ML
10 INJECTION, POWDER, FOR SOLUTION INTRAMUSCULAR EVERY 8 HOURS PRN
Status: DISCONTINUED | OUTPATIENT
Start: 2024-08-19 | End: 2024-08-19

## 2024-08-19 RX ORDER — LOPERAMIDE HYDROCHLORIDE 2 MG/1
2 CAPSULE ORAL
Status: DISCONTINUED | OUTPATIENT
Start: 2024-08-19 | End: 2024-08-21 | Stop reason: HOSPADM

## 2024-08-19 RX ORDER — HYDROXYZINE PAMOATE 50 MG/1
50 CAPSULE ORAL EVERY 6 HOURS PRN
Status: DISCONTINUED | OUTPATIENT
Start: 2024-08-19 | End: 2024-08-19

## 2024-08-19 RX ORDER — ACETAMINOPHEN 325 MG/1
650 TABLET ORAL EVERY 6 HOURS PRN
Status: DISCONTINUED | OUTPATIENT
Start: 2024-08-19 | End: 2024-08-21 | Stop reason: HOSPADM

## 2024-08-19 RX ORDER — BENZONATATE 100 MG/1
100 CAPSULE ORAL 3 TIMES DAILY PRN
Status: DISCONTINUED | OUTPATIENT
Start: 2024-08-19 | End: 2024-08-21 | Stop reason: HOSPADM

## 2024-08-19 RX ORDER — ALUMINUM HYDROXIDE, MAGNESIUM HYDROXIDE, AND SIMETHICONE 1200; 120; 1200 MG/30ML; MG/30ML; MG/30ML
30 SUSPENSION ORAL EVERY 6 HOURS PRN
Status: DISCONTINUED | OUTPATIENT
Start: 2024-08-19 | End: 2024-08-21 | Stop reason: HOSPADM

## 2024-08-19 RX ORDER — ONDANSETRON 4 MG/1
4 TABLET, ORALLY DISINTEGRATING ORAL EVERY 8 HOURS PRN
Status: DISCONTINUED | OUTPATIENT
Start: 2024-08-19 | End: 2024-08-21 | Stop reason: HOSPADM

## 2024-08-19 RX ORDER — PROMETHAZINE HYDROCHLORIDE 25 MG/1
25 TABLET ORAL EVERY 6 HOURS PRN
Status: DISCONTINUED | OUTPATIENT
Start: 2024-08-19 | End: 2024-08-21 | Stop reason: HOSPADM

## 2024-08-19 RX ORDER — OLANZAPINE 10 MG/1
10 TABLET ORAL EVERY 8 HOURS PRN
Status: DISCONTINUED | OUTPATIENT
Start: 2024-08-19 | End: 2024-08-21 | Stop reason: HOSPADM

## 2024-08-19 RX ADMIN — NICOTINE 1 PATCH: 21 PATCH, EXTENDED RELEASE TRANSDERMAL at 07:08

## 2024-08-19 RX ADMIN — QUETIAPINE FUMARATE 50 MG: 25 TABLET ORAL at 08:08

## 2024-08-19 NOTE — HPI
Patient is a 33 year old male with medical history of ADHD, anxiety and depression who was admitted to [facility] for psychosis.  Hospital medicine consulted for medical management.
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
## 2024-08-19 NOTE — NURSING
Patient arrived to [facility] from [facility] with patient, scanned, no contraband found. Ambulated to assessment room without difficulty.

## 2024-08-19 NOTE — PROGRESS NOTES
"   08/19/24 1100   [facility] Therapy   Group Name Other  (1:1)   Specific Interventions Coping Skills Training  (positive coping skills for stress and anxiety skilled worksheet)   Participation Level Sharing   Participation Quality Lack of Interest;Cooperative   Insight/Motivation Other (see comments)   Affect/Mood Display Irritable   Cognition Alert   Psychomotor WNL     Patient isolating in assigned room, resting in bed, presents slightly irritable, focused on discharge, reports a "fine" mood, "I don't need to be here. This is all a misunderstanding. I should be at work. I'm self-employed. I'm taking up someone else's bed. When can I go home?" Patient accepted an alternative worksheet that focused on positive coping skills for stress and anxiety.  "

## 2024-08-19 NOTE — CONSULTS
[facility] Hospital Medicine  Consult Note    Patient Name: Enrike Tao  MRN: [id number]  Admission Date: 8/19/2024  Hospital Length of Stay: 0 days  Attending Physician: Andrew Cantu MD   Primary Care Provider: [facility]           Patient information was obtained from patient and ER records.     Inpatient consult to [facility] for History and Physical  Consult performed by: Alina Mehta PA-C  Consult ordered by: Andrew Cantu MD        Subjective:     Principal Problem: <principal problem not specified>    Chief Complaint: No chief complaint on file.       HPI: Patient is a 33 year old male with medical history of ADHD, anxiety and depression who was admitted to [facility] for psychosis.  Hospital medicine consulted for medical management.          Past Medical History:   Diagnosis Date    ADHD (attention deficit hyperactivity disorder)     Anxiety     Depression        Past Surgical History:   Procedure Laterality Date    ANKLE SURGERY      left       Review of patient's allergies indicates:   Allergen Reactions    Amoxicillin Hives       Current Facility-Administered Medications on File Prior to Encounter   Medication    [DISCONTINUED] diphenhydrAMINE injection 50 mg    [DISCONTINUED] haloperidol lactate injection 5 mg    [DISCONTINUED] LORazepam injection 2 mg     No current outpatient medications on file prior to encounter.     Family History       Problem Relation (Age of Onset)    Alcohol abuse Father    Cancer Maternal Grandmother, Maternal Grandfather    Diabetes Father, Paternal Grandmother, Paternal Grandfather    Hypertension Father, Paternal Grandmother, Paternal Grandfather          Tobacco Use    Smoking status: Every Day     Current packs/day: 2.00     Average packs/day: 2.0 packs/day for 21.8 years (43.5 ttl pk-yrs)     Types: Cigarettes     Start date: 11/10/2002    Smokeless tobacco: Never   Substance and Sexual Activity    Alcohol use: No     Drug use: No    Sexual activity: Yes     Partners: Female     Review of Systems   Constitutional:  Negative for chills and fever.   Respiratory:  Negative for cough, shortness of breath and wheezing.    Cardiovascular:  Negative for chest pain and palpitations.   Gastrointestinal:  Negative for abdominal distention, constipation, diarrhea, nausea and vomiting.   Genitourinary:  Negative for difficulty urinating and dysuria.     Objective:     Vital Signs (Most Recent):  Temp: 97.5 °F (36.4 °C) (08/19/24 0724)  Pulse: 61 (08/19/24 0724)  Resp: 16 (08/19/24 0724)  BP: (!) 103/55 (08/19/24 0724)  SpO2: 99 % (08/19/24 0724) Vital Signs (24h Range):  Temp:  [97.5 °F (36.4 °C)-98.6 °F (37 °C)] 97.5 °F (36.4 °C)  Pulse:  [60-99] 61  Resp:  [16-20] 16  SpO2:  [97 %-99 %] 99 %  BP: (103-147)/() 103/55     Weight: 49.3 kg (108 lb 11 oz)  Body mass index is 18.66 kg/m².     Physical Exam  Constitutional:       Appearance: Normal appearance.   HENT:      Head: Normocephalic and atraumatic.   Cardiovascular:      Rate and Rhythm: Normal rate and regular rhythm.   Pulmonary:      Effort: Pulmonary effort is normal.      Breath sounds: Normal breath sounds.   Abdominal:      General: There is no distension.      Tenderness: There is no abdominal tenderness.   Musculoskeletal:         General: No swelling or tenderness.      Cervical back: Neck supple.      Right lower leg: No edema.      Left lower leg: No edema.   Skin:     General: Skin is warm and dry.   Neurological:      Mental Status: He is alert.      Cranial Nerves: Cranial nerves 2-12 are intact.      Comments: CN II: Visual Fields full to confrontation  CN III, IV , VI PERRL   CN III: no palsy   Nystagmus: none  Diplopia: none  Ophthalmoparesis: none  CN V Facial sensation intact  CN VII facial expression full, symmetric  CN VIII normal  CN IX normal  CN X normal  CN XI normal  CN XII normal           Significant Labs: UPT  No results found for this or any previous  visit.  U/A  Negative for UTI   UDS  Results for orders placed or performed during the hospital encounter of 08/18/24   Drug screen panel, in-house   Result Value Ref Range    Benzodiazepines Negative Negative    Methadone metabolites Negative Negative    Cocaine (Metab.) Negative Negative    Opiate Scrn, Ur Negative Negative    Barbiturate Screen, Ur Negative Negative    Amphetamine Screen, Ur Negative Negative    THC Presumptive Positive (A) Negative    Phencyclidine Negative Negative    Creatinine, Urine >450.0 (H) 23.0 - 375.0 mg/dL    Toxicology Information SEE COMMENT      CBC  Results for orders placed or performed during the hospital encounter of 08/18/24   CBC Auto Differential   Result Value Ref Range    WBC 11.80 3.90 - 12.70 K/uL    RBC 5.34 4.60 - 6.20 M/uL    Hemoglobin 17.1 14.0 - 18.0 g/dL    Hematocrit 47.1 40.0 - 54.0 %    MCV 88 82 - 98 fL    MCH 32.0 (H) 27.0 - 31.0 pg    MCHC 36.3 (H) 32.0 - 36.0 g/dL    RDW 11.8 11.5 - 14.5 %    Platelets 222 150 - 450 K/uL    MPV 10.2 9.2 - 12.9 fL    Immature Granulocytes 0.3 0.0 - 0.5 %    Gran # (ANC) 7.8 (H) 1.8 - 7.7 K/uL    Immature Grans (Abs) 0.04 0.00 - 0.04 K/uL    Lymph # 3.0 1.0 - 4.8 K/uL    Mono # 0.8 0.3 - 1.0 K/uL    Eos # 0.1 0.0 - 0.5 K/uL    Baso # 0.05 0.00 - 0.20 K/uL    nRBC 0 0 /100 WBC    Gran % 66.1 38.0 - 73.0 %    Lymph % 25.7 18.0 - 48.0 %    Mono % 6.5 4.0 - 15.0 %    Eosinophil % 1.0 0.0 - 8.0 %    Basophil % 0.4 0.0 - 1.9 %    Differential Method Automated      CMP  Results for orders placed or performed during the hospital encounter of 08/18/24   Comprehensive Metabolic Panel   Result Value Ref Range    Sodium 140 136 - 145 mmol/L    Potassium 3.7 3.5 - 5.1 mmol/L    Chloride 102 95 - 110 mmol/L    CO2 25 23 - 29 mmol/L    Glucose 110 70 - 110 mg/dL    BUN 10 6 - 20 mg/dL    Creatinine 1.0 0.5 - 1.4 mg/dL    Calcium 10.0 8.7 - 10.5 mg/dL    Total Protein 7.6 6.0 - 8.4 g/dL    Albumin 4.7 3.5 - 5.2 g/dL    Total Bilirubin 0.7 0.1  - 1.0 mg/dL    Alkaline Phosphatase 62 55 - 135 U/L    AST 13 10 - 40 U/L    ALT 14 10 - 44 U/L    eGFR >60 >60 mL/min/1.73 m^2    Anion Gap 13 8 - 16 mmol/L     TSH  Results for orders placed or performed during the hospital encounter of 08/18/24   TSH   Result Value Ref Range    TSH 0.729 0.400 - 4.000 uIU/mL     ETOH  Results for orders placed or performed during the hospital encounter of 08/18/24   Ethanol   Result Value Ref Range    Alcohol, Serum <10 <10 mg/dL     Salicylate  Results for orders placed or performed during the hospital encounter of 08/18/24   Salicylate Level   Result Value Ref Range    Salicylate Lvl <5.0 (L) 15.0 - 30.0 mg/dL     Acetaminophen  Results for orders placed or performed during the hospital encounter of 08/18/24   Acetaminophen Level   Result Value Ref Range    Acetaminophen (Tylenol), Serum <3.0 (L) 10.0 - 20.0 ug/mL             Significant Imaging: none  Assessment/Plan:     Marijuana use  THC positive on UDS  Cessation resources       Psychosis  Defer to psych         VTE Risk Mitigation (From admission, onward)      None                Thank you for your consult. I will sign off. Please contact us if you have any additional questions.    Alina Mehta PA-C  Department of Hospital Medicine   [facility]

## 2024-08-19 NOTE — NURSING
Pt cooperative with an animated affect. Pt reports he met a girl on Facebook and they would post in code where only they know what the other is taking about. They then joined a group and he found out their communication was being shared. The patient believes now that that group is bad and some are after him. Patient denies SI/HI no self harming behavior displayed, no aggressive behavior displayed towards others. Patient contracted safety with staff and unit.  Educated, reviewed  and discussed plan of care and medication regiment with this patient. Patient voiced understanding of all teachings

## 2024-08-19 NOTE — PSYCH
Pt presents with a story of getting scammed in some way after communicating somewhat anonymously with females through a social media program.  He states that the people aren't able to see each other but share information about each other.  He feels like his information has been gotten through some sort of scam and he is worried that people may now be trying to lure him somewhere where they can crow him or hurt him.  Pt does not present elevated or manic, has no history of psych treatment.  He is calm and states he understands after telling his family this why they got worried but states there is nothing wrong with him mentally.  Pt states he was just very afraid someone was going to try to hurt him or steal his money.  He is able to rest and comply with instructions without any difficulty.  Not being aggressive, behavior is normal.  He was told to be patient to speak with the doctor, and he is doing so.  He denies S/I or H/I.  Will continue to monitor.

## 2024-08-19 NOTE — NURSING
New admit. Pt sleeping at this time, slept 2.5 hrs with no awakenings. NAD. Resp even and unlabored.Pathways clear,bed in low position. Q 15 min safety check ongoing.All precautions maintained.

## 2024-08-19 NOTE — PROGRESS NOTES
08/19/24 1400   [facility] Group Therapy   Group Name Therapeutic Recreation   Specific Interventions Skilled Activity Self-Expression  (what do I want to change skilled activity worksheet)   Participation Level Sharing;Appropriate   Participation Quality Cooperative;Social   Insight/Motivation Improved   Affect/Mood Display Appropriate   Cognition Alert   Psychomotor WNL     Patient presents calm, cooperative, socializing with peers, reports a good mood, focused on discharge. Patient identified changes he wants to make, socialize with others, ask for help when needed, pay my bills on time and stop over spending. Patient verbalized he is a single parent of 2 kids and is learning how to budget better.

## 2024-08-19 NOTE — PSYCH
"Meditation/Education:  Guided: Meditation Exercise  Patient Response:  "Patient attended session and gave positive feedback and found the session helpful".              "

## 2024-08-19 NOTE — SUBJECTIVE & OBJECTIVE
Past Medical History:   Diagnosis Date    ADHD (attention deficit hyperactivity disorder)     Anxiety     Depression        Past Surgical History:   Procedure Laterality Date    ANKLE SURGERY      left       Review of patient's allergies indicates:   Allergen Reactions    Amoxicillin Hives       Current Facility-Administered Medications on File Prior to Encounter   Medication    [DISCONTINUED] diphenhydrAMINE injection 50 mg    [DISCONTINUED] haloperidol lactate injection 5 mg    [DISCONTINUED] LORazepam injection 2 mg     No current outpatient medications on file prior to encounter.     Family History       Problem Relation (Age of Onset)    Alcohol abuse Father    Cancer Maternal Grandmother, Maternal Grandfather    Diabetes Father, Paternal Grandmother, Paternal Grandfather    Hypertension Father, Paternal Grandmother, Paternal Grandfather          Tobacco Use    Smoking status: Every Day     Current packs/day: 2.00     Average packs/day: 2.0 packs/day for 21.8 years (43.5 ttl pk-yrs)     Types: Cigarettes     Start date: 11/10/2002    Smokeless tobacco: Never   Substance and Sexual Activity    Alcohol use: No    Drug use: No    Sexual activity: Yes     Partners: Female     Review of Systems   Constitutional:  Negative for chills and fever.   Respiratory:  Negative for cough, shortness of breath and wheezing.    Cardiovascular:  Negative for chest pain and palpitations.   Gastrointestinal:  Negative for abdominal distention, constipation, diarrhea, nausea and vomiting.   Genitourinary:  Negative for difficulty urinating and dysuria.     Objective:     Vital Signs (Most Recent):  Temp: 97.5 °F (36.4 °C) (08/19/24 0724)  Pulse: 61 (08/19/24 0724)  Resp: 16 (08/19/24 0724)  BP: (!) 103/55 (08/19/24 0724)  SpO2: 99 % (08/19/24 0724) Vital Signs (24h Range):  Temp:  [97.5 °F (36.4 °C)-98.6 °F (37 °C)] 97.5 °F (36.4 °C)  Pulse:  [60-99] 61  Resp:  [16-20] 16  SpO2:  [97 %-99 %] 99 %  BP: (103-147)/() 103/55      Weight: 49.3 kg (108 lb 11 oz)  Body mass index is 18.66 kg/m².     Physical Exam  Constitutional:       Appearance: Normal appearance.   HENT:      Head: Normocephalic and atraumatic.   Cardiovascular:      Rate and Rhythm: Normal rate and regular rhythm.   Pulmonary:      Effort: Pulmonary effort is normal.      Breath sounds: Normal breath sounds.   Abdominal:      General: There is no distension.      Tenderness: There is no abdominal tenderness.   Musculoskeletal:         General: No swelling or tenderness.      Cervical back: Neck supple.      Right lower leg: No edema.      Left lower leg: No edema.   Skin:     General: Skin is warm and dry.   Neurological:      Mental Status: He is alert.      Cranial Nerves: Cranial nerves 2-12 are intact.      Comments: CN II: Visual Fields full to confrontation  CN III, IV , VI PERRL   CN III: no palsy   Nystagmus: none  Diplopia: none  Ophthalmoparesis: none  CN V Facial sensation intact  CN VII facial expression full, symmetric  CN VIII normal  CN IX normal  CN X normal  CN XI normal  CN XII normal           Significant Labs: UPT  No results found for this or any previous visit.  U/A  Negative for UTI   UDS  Results for orders placed or performed during the hospital encounter of 08/18/24   Drug screen panel, in-house   Result Value Ref Range    Benzodiazepines Negative Negative    Methadone metabolites Negative Negative    Cocaine (Metab.) Negative Negative    Opiate Scrn, Ur Negative Negative    Barbiturate Screen, Ur Negative Negative    Amphetamine Screen, Ur Negative Negative    THC Presumptive Positive (A) Negative    Phencyclidine Negative Negative    Creatinine, Urine >450.0 (H) 23.0 - 375.0 mg/dL    Toxicology Information SEE COMMENT      CBC  Results for orders placed or performed during the hospital encounter of 08/18/24   CBC Auto Differential   Result Value Ref Range    WBC 11.80 3.90 - 12.70 K/uL    RBC 5.34 4.60 - 6.20 M/uL    Hemoglobin 17.1 14.0 -  18.0 g/dL    Hematocrit 47.1 40.0 - 54.0 %    MCV 88 82 - 98 fL    MCH 32.0 (H) 27.0 - 31.0 pg    MCHC 36.3 (H) 32.0 - 36.0 g/dL    RDW 11.8 11.5 - 14.5 %    Platelets 222 150 - 450 K/uL    MPV 10.2 9.2 - 12.9 fL    Immature Granulocytes 0.3 0.0 - 0.5 %    Gran # (ANC) 7.8 (H) 1.8 - 7.7 K/uL    Immature Grans (Abs) 0.04 0.00 - 0.04 K/uL    Lymph # 3.0 1.0 - 4.8 K/uL    Mono # 0.8 0.3 - 1.0 K/uL    Eos # 0.1 0.0 - 0.5 K/uL    Baso # 0.05 0.00 - 0.20 K/uL    nRBC 0 0 /100 WBC    Gran % 66.1 38.0 - 73.0 %    Lymph % 25.7 18.0 - 48.0 %    Mono % 6.5 4.0 - 15.0 %    Eosinophil % 1.0 0.0 - 8.0 %    Basophil % 0.4 0.0 - 1.9 %    Differential Method Automated      CMP  Results for orders placed or performed during the hospital encounter of 08/18/24   Comprehensive Metabolic Panel   Result Value Ref Range    Sodium 140 136 - 145 mmol/L    Potassium 3.7 3.5 - 5.1 mmol/L    Chloride 102 95 - 110 mmol/L    CO2 25 23 - 29 mmol/L    Glucose 110 70 - 110 mg/dL    BUN 10 6 - 20 mg/dL    Creatinine 1.0 0.5 - 1.4 mg/dL    Calcium 10.0 8.7 - 10.5 mg/dL    Total Protein 7.6 6.0 - 8.4 g/dL    Albumin 4.7 3.5 - 5.2 g/dL    Total Bilirubin 0.7 0.1 - 1.0 mg/dL    Alkaline Phosphatase 62 55 - 135 U/L    AST 13 10 - 40 U/L    ALT 14 10 - 44 U/L    eGFR >60 >60 mL/min/1.73 m^2    Anion Gap 13 8 - 16 mmol/L     TSH  Results for orders placed or performed during the hospital encounter of 08/18/24   TSH   Result Value Ref Range    TSH 0.729 0.400 - 4.000 uIU/mL     ETOH  Results for orders placed or performed during the hospital encounter of 08/18/24   Ethanol   Result Value Ref Range    Alcohol, Serum <10 <10 mg/dL     Salicylate  Results for orders placed or performed during the hospital encounter of 08/18/24   Salicylate Level   Result Value Ref Range    Salicylate Lvl <5.0 (L) 15.0 - 30.0 mg/dL     Acetaminophen  Results for orders placed or performed during the hospital encounter of 08/18/24   Acetaminophen Level   Result Value Ref Range     Acetaminophen (Tylenol), Serum <3.0 (L) 10.0 - 20.0 ug/mL             Significant Imaging: none

## 2024-08-20 LAB
CHOLEST SERPL-MCNC: 161 MG/DL (ref 120–199)
CHOLEST/HDLC SERPL: 3.6 {RATIO} (ref 2–5)
ESTIMATED AVG GLUCOSE: 97 MG/DL (ref 68–131)
HBA1C MFR BLD: 5 % (ref 4–5.6)
HDLC SERPL-MCNC: 45 MG/DL (ref 40–75)
HDLC SERPL: 28 % (ref 20–50)
LDLC SERPL CALC-MCNC: 99.8 MG/DL (ref 63–159)
NONHDLC SERPL-MCNC: 116 MG/DL
TRIGL SERPL-MCNC: 81 MG/DL (ref 30–150)

## 2024-08-20 PROCEDURE — 80061 LIPID PANEL: CPT | Performed by: PSYCHIATRY & NEUROLOGY

## 2024-08-20 PROCEDURE — 83036 HEMOGLOBIN GLYCOSYLATED A1C: CPT | Performed by: PSYCHIATRY & NEUROLOGY

## 2024-08-20 PROCEDURE — 90833 PSYTX W PT W E/M 30 MIN: CPT | Mod: ,,, | Performed by: STUDENT IN AN ORGANIZED HEALTH CARE EDUCATION/TRAINING PROGRAM

## 2024-08-20 PROCEDURE — 25000003 PHARM REV CODE 250: Performed by: PSYCHIATRY & NEUROLOGY

## 2024-08-20 PROCEDURE — 36415 COLL VENOUS BLD VENIPUNCTURE: CPT | Performed by: PSYCHIATRY & NEUROLOGY

## 2024-08-20 PROCEDURE — S4991 NICOTINE PATCH NONLEGEND: HCPCS | Performed by: PSYCHIATRY & NEUROLOGY

## 2024-08-20 PROCEDURE — 99232 SBSQ HOSP IP/OBS MODERATE 35: CPT | Mod: ,,, | Performed by: STUDENT IN AN ORGANIZED HEALTH CARE EDUCATION/TRAINING PROGRAM

## 2024-08-20 PROCEDURE — 11400000 HC PSYCH PRIVATE ROOM

## 2024-08-20 PROCEDURE — 25000003 PHARM REV CODE 250: Performed by: STUDENT IN AN ORGANIZED HEALTH CARE EDUCATION/TRAINING PROGRAM

## 2024-08-20 RX ORDER — QUETIAPINE FUMARATE 100 MG/1
100 TABLET, FILM COATED ORAL NIGHTLY
Status: DISCONTINUED | OUTPATIENT
Start: 2024-08-20 | End: 2024-08-21 | Stop reason: HOSPADM

## 2024-08-20 RX ADMIN — NICOTINE 1 PATCH: 21 PATCH, EXTENDED RELEASE TRANSDERMAL at 08:08

## 2024-08-20 RX ADMIN — QUETIAPINE FUMARATE 100 MG: 100 TABLET ORAL at 08:08

## 2024-08-20 NOTE — PLAN OF CARE
Problem: Adult Behavioral Health Plan of Care  Goal: Rounds/Family Conference  Outcome: Progressing  Flowsheets (Taken 8/20/2024 1044)  Participants:   psychiatrist   nursing   other ([facility] and med students)   TREATMENT TEAM      Chief Complaint:    Pt is a 33 year old  male with chief complaints of psychosis.    Pt states he met a girl and felt like he got setup and there was some messages that were sent in code and talked to family and they called the police and he ended up in the hospital.   Pt is very addiment that he is not hallucinating and is not paranoid.      Pt Goal(s):    Pt states to go back home and to talk to the detectives to get things sorted out.     Current Progress:   Pt attended treatment team dressed in personal clothing    Pt affect consistent with mood/anxious mood, with rapid speech.   Pt states he needs to talk to detectives right now because the way he talks to this girl and there is more than what it is.  Pt denies SI.  Pt denies side effects to medications.   Pt states he was stressed out due to feeling that he was being robbed, but no one believes him.   Pt remains with field delusions about being robbed from coded facebook messages.  Treatment team explored how it can be difficult to have insight with paranoia and lack of sleep. Pt was able to acecpt this and agrees to avoid his triggers in the future.   Program/groups:    Encourage pt to continue to all groups.       Revisions to Plan:  Encourage pt to attend treatment team and to attend all groups. Recommendations are for pt to attend psychotherapy, rehab, and medication management. Pt refusing rehab at this time.

## 2024-08-20 NOTE — PROGRESS NOTES
08/20/24 1400   [facility] Group Therapy   Group Name Leisure Education   Specific Interventions Leisure Counseling   Participation Level Appropriate;Attentive;Sharing   Participation Quality Cooperative;Social   Insight/Motivation Applies New Skills;Good   Affect/Mood Display Appropriate   Cognition Alert   Psychomotor WNL     Patient presents calm, cooperative, reports a good mood. Patient shared that his goal is to ask for help more when needed. Patient shared he watch movies to relax, fish to be alone, read to learn something new and spend time with his family for enjoyment.

## 2024-08-20 NOTE — PSYCH
"Meditation/Education:  Silencing The Inner Critic: Meditation Exercise  Patient Response:  "Patient attended session and gave positive feedback and found the session helpful".              "

## 2024-08-20 NOTE — PLAN OF CARE
"Patient reports feeling "okay" today, spending majority of time in activity room interacting with staff and peers, and intermittently on telephone in hallway calling family asking them to go check on his house and checking to be sure his children are safe. After one phone call, patient to nurses station asking what his PM medications are and asking for a Xanex. Educated patient on PM medication ordered and took without difficulty. Denies SI/HI. Denies hallucinations. Continues to have paranoid thoughts about someone being after him and his family. Appetite adequate. Medication compliant. NADN. Remains calm and cooperative. Safety precautions remain in place.  "

## 2024-08-20 NOTE — PLAN OF CARE
Verbal contract for safety obtained. VSS.   Restricted affect. Paranoid. Out in dayroom at times. Ambulates. Encouraged to attend groups and activities. Accepts meals and meds without difficulty. Monitored Q/15 minutes for fall and safety precautions per staff.  Will continue to monitor for needs and safety.

## 2024-08-20 NOTE — PLAN OF CARE
Plan of care reviewed. Denies intent to harm self or others at this time. Denies hallucinations and delusions. Accepts snacks and medications. Gait steady, no falls. Interacting with staff and peers. Patient received visit from mother. He states he is looking forward to discharge. Will continue precautions and monitor for safety.

## 2024-08-20 NOTE — NURSING
Patient sleeping at this time, slept 8 hours with no awakenings. Respirations even and unlabored. No distress noted. Q15 min safety check ongoing. Safety precautions remain in place.

## 2024-08-20 NOTE — PROGRESS NOTES
"   08/20/24 0845   Assessment   Patient's Identification of the Problem Patient presents cooperative, tearful, anxious, reports a "stressed" mood, "I'm worried about my kids. Family members are trying to take them from me." Patient states his admit is due to "it's a facebook thing." Patient reports that he looked up a 's name who worked at the [employer] stop, on facebook, started texting her through a code on facebook, "everything I was posting was being shared. It's a group of people trying to crow me."  Patient states the name of the group is "[employer]." Patient verbalized he is paranoid and suspicious about the group seeing him through the camera on his phone and is paranoid about people following him. Patient states he told his family, they didn't believe him and brought him to the hospital. Per H&P chief compliant: Psychosis. Patient admits to negative leisure lifestyle of tabacco, marijuana and alcohol occasionally. 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 Patient verbalized his main goal, "I want to feel better." Patient states "I want the  to investigate this and to get to the bottom of it."   Leisure Interest Enjoy Family/Friends;Fishing;Cooking;Sit Outside  (spend time with children)   Leisure Barriers Too Busy;Fears/Phobias  (getting cut, seeing blood)   Treatment Focus To Improve Reality Orientation;To Improve Mood;To Improve Leisure Awareness/Lifestyle/Interest;To Promote Successful and Safe Self Expression;To Improve Coping Skills       Treatment Recommendation:   1:1 Intervention (as needed)    Reality Orientation Skilled Activity  Cognitive Stimulation Skilled Activity  Mild Exercises Skilled Activity  Stress Management Skilled Activity  Coping Skilled Activity  Leisure Education and Awareness Skilled Activity    Treatment Goal(s):  Long " Term Goals Refer To Master Treatment Plan    Short Term Treatment Goal(s)  Patient Will:  Comply with Treatment  Exhibit Improvement in Mood  Demonstrate Improvement in Thought Processes / Awareness  Demonstrate Constructive Expression of Feelings and Behavior  Verbalize Improvement in Mood    Discharge Recommendations:  Encourage Patient to Utilize Coping Skills on a Regular Basis to Reduce the Risk of Decompensating and Re-Hospitalizations  Follow Up with After Care Appointments

## 2024-08-20 NOTE — PROGRESS NOTES
"   08/20/24 1000   [facility] Group Therapy   Group Name Therapeutic Recreation   Specific Interventions Cognitive Stimulation Training   Participation Level Appropriate;Attentive;Sharing   Participation Quality Cooperative;Social   Insight/Motivation Improved   Affect/Mood Display Appropriate   Cognition Alert   Psychomotor WNL     Patient presents tearful, anxious, reports "stressed" mood, "I'm worried about my kids." Patient verbalized "it's hard to answer questions because I know he truth about meeting the girl and writing in codes on facebook." Patient expressed the challenges he has to overcome is "how to be a better mother and father... I'm a good father." Patient states he is good at working, drawing and taking care of his family.  "

## 2024-08-20 NOTE — PROGRESS NOTES
"PSYCHIATRY DAILY INPATIENT PROGRESS NOTE  SUBSEQUENT HOSPITAL VISIT    ENCOUNTER DATE: 8/20/2024  SITE[facility]    DATE OF ADMISSION: 8/19/2024  1:21 AM  LENGTH OF STAY: 1 days      CHIEF COMPLAINT   Enrike Tao is a 33 y.o. male, seen during daily michelle rounds on the inpatient unit.  Enrike Tao presented with the chief complaint of psychosis      The patient was seen and examined. The chart was reviewed.     Reviewed notes from Rns and [facility] and labs from the last 24 hours.    The patient's case was discussed with the treatment team/care providers today including Rns    Staff reports no behavioral or management issues.     The patient has been compliant with treatment.      Subjective 08/20/2024       Today the patient reports "it was being posted in code on facebook, my family didn't believe me, they called the ."    Delusion remains fixed, reality testing not intact, continues to believe he was set up to be robbed from coded facebook messages.    Discussed how patient's recent MJ abuse, sleep deprivation and stress/anxiety could in combination trigger paranoia which pt agrees with. He agrees to abstain from further substance abuse and also agrees with continuing seroquel to treat his paranoia, "whatever you guys recommend." Explored how it can be difficult to have insight in regards to paranoia, pt was able to accept this and agrees to avoid triggers in the future.     The patient denies any side effects to medications.    At this time symptoms remains severe, functionally and behaviorally impairing and pt remains in need of continued acute inpatient hospitalization for both safety and stabilization.           Interim/overnight events per report/notes:          Psychiatric ROS (observed, reported, or endorsed/denied):  Depressed mood - No  Interest/pleasure/anhedonia: No  Guilt/hopelessness/worthlessness - No  Changes in Sleep - No  Changes in Appetite - No  Changes in Concentration - " No  Changes in Energy - No  PMA/R- No  Suicidal- active/passive ideations - No  Homicidal ideations: active/passive ideations - No    Hallucinations - No  Delusions - fluctuating  Disorganized behavior - No  Disorganized speech - No  Negative symptoms - No    Elevated mood - No  Decreased need for sleep - No  Grandiosity - No  Racing thoughts - No  Impulsivity - No  Irritability- No  Increased energy - No  Distractibility - No  Increase in goal-directed activity or PMA- No    Symptoms of ZIYAD - No  Symptoms of Panic Disorder- No  Symptoms of PTSD - No        Overall progress: Patient is showing mild improvement         Psychotherapy:  Target symptoms: substance abuse, psychosis  Why chosen therapy is appropriate versus another modality: relevant to diagnosis  Outcome monitoring methods: self-report  Therapeutic intervention type: supportive psychotherapy  Topics discussed/themes: building skills sets for symptom management, symptom recognition, substance abuse  The patient's response to the intervention is accepting. The patient's progress toward treatment goals is fair.   Duration of intervention: 16 minutes.            Medical ROS  Review of Systems   Constitutional:  Negative for chills and fever.   HENT:  Negative for hearing loss.    Eyes:  Negative for blurred vision and double vision.   Respiratory:  Negative for shortness of breath.    Cardiovascular:  Negative for chest pain and palpitations.   Gastrointestinal:  Negative for constipation, diarrhea, nausea and vomiting.   Genitourinary:  Negative for dysuria.   Musculoskeletal:  Negative for back pain and neck pain.   Skin:  Negative for rash.   Neurological:  Negative for dizziness and headaches.   Endo/Heme/Allergies:  Negative for environmental allergies.         PAST MEDICAL HISTORY   Past Medical History:   Diagnosis Date    ADHD (attention deficit hyperactivity disorder)     Anxiety     Depression            PSYCHOTROPIC MEDICATIONS   Scheduled Meds:    "nicotine  1 patch Transdermal Daily    QUEtiapine  50 mg Oral QHS     Continuous Infusions:  PRN Meds:.  Current Facility-Administered Medications:     acetaminophen, 650 mg, Oral, Q6H PRN    aluminum-magnesium hydroxide-simethicone, 30 mL, Oral, Q6H PRN    benzonatate, 100 mg, Oral, TID PRN    benztropine mesylate, 2 mg, Intramuscular, Q8H PRN    hydrOXYzine pamoate, 50 mg, Oral, Q6H PRN    loperamide, 2 mg, Oral, PRN    nicotine, 1 patch, Transdermal, Daily PRN    OLANZapine, 10 mg, Oral, Q8H PRN **AND** OLANZapine, 10 mg, Intramuscular, Q8H PRN    ondansetron, 4 mg, Oral, Q8H PRN    promethazine, 25 mg, Oral, Q6H PRN        EXAMINATION    VITALS   Vitals:    08/19/24 0130 08/19/24 0724 08/19/24 1911 08/20/24 0722   BP: (!) 130/92 (!) 103/55 115/71 122/60   BP Location: Right arm Right arm Left arm Left arm   Patient Position: Sitting Lying Sitting Lying   Pulse: 91 61 65 (!) 59   Resp: 18 16 18 18   Temp: 97.7 °F (36.5 °C) 97.5 °F (36.4 °C) 98.4 °F (36.9 °C) 98.4 °F (36.9 °C)   TempSrc: Temporal  Temporal Tympanic   SpO2: 97% 99% 99% 99%   Weight: 49.3 kg (108 lb 11 oz)      Height: 5' 4" (1.626 m)          Body mass index is 18.66 kg/m².        CONSTITUTIONAL  General Appearance: unremarkable, age appropriate    MUSCULOSKELETAL  Muscle Strength and Tone:no tremor, no tic  Abnormal Involuntary Movements: No  Gait and Station: non-ataxic    PSYCHIATRIC   Level of Consciousness: awake and alert   Orientation: person, place, and situation  Grooming: Casually dressed and Well groomed  Psychomotor Behavior: normal, cooperative  Speech: normal tone, normal rate, normal pitch, normal volume  Language: grossly intact  Mood: fine  Affect: Consistent with mood  Thought Process: linear  Associations: intact   Thought Content: +delusions, denies SI, and denies HI  Perceptions: denies AH and denies  VH  Memory: Able to recall past events, Remote intact, and Recent intact  Attention:Attends to interview without " distraction  Fund of Knowledge: Aware of current events and Vocabulary appropriate   Estimate if Intelligence:  Average based on work/education history, vocabulary and mental status exam  Insight: limited awareness of illness  Judgment: behavior is adequate to circumstances        DIAGNOSTIC TESTING   Laboratory Results  Recent Results (from the past 24 hour(s))   Lipid Panel    Collection Time: 08/20/24  6:20 AM   Result Value Ref Range    Cholesterol 161 120 - 199 mg/dL    Triglycerides 81 30 - 150 mg/dL    HDL 45 40 - 75 mg/dL    LDL Cholesterol 99.8 63.0 - 159.0 mg/dL    HDL/Cholesterol Ratio 28.0 20.0 - 50.0 %    Total Cholesterol/HDL Ratio 3.6 2.0 - 5.0    Non-HDL Cholesterol 116 mg/dL           MEDICAL DECISION MAKING          ASSESSMENT         Unspecified psychosis  Cannabis abuse  Psychosocial stressors  Nicotine dependence           PROBLEM LIST AND MANAGEMENT PLANS          Unspecified psychosis  - continue seroquel 50 mg PO qhs -increase to 100 mg PO qhs tonight  - pt counseled  - follow up with outpatient mental health providers after discharge for medication management and psychotherapy     Cannabis abuse  - pt counseled  - follow up with outpatient mental health providers after discharge for medication management and psychotherapy     Psychosocial stressors  - pt counseled  - SW consulted for assistance with additional resources      Nicotine dependence  - start nicotine patch 14 mg PO qd PRN                  Discussed diagnosis, risks and benefits of proposed treatment vs alternative treatments vs no treatment, potential side effects of these treatments and the inherent unpredictability of treatment. The patient expresses understanding of the above and displays the capacity to agree with this treatment given said understanding. Patient also agrees that, currently, the benefits outweigh the risks and would like to pursue/continue treatment at this time.    Any medications being used off-label were  discussed with the patient inclusive of the evidence base for the use of the medications and consent was obtained for the off-label use of the medication.       DISCHARGE PLANNING  Expected Disposition Plan: Home or Self Care      NEED FOR CONTINUED HOSPITALIZATION  Psychiatric illness continues to pose a potential threat to life or bodily function, of self or others, thereby requiring the need for continued inpatient psychiatric hospitalization: Yes, due to: gravely disabled, as evidenced by:  Ongoing concerns with perceptual aberrancy and paranoid persecutory delusions leading to potential harm of self or others.    Protective inpatient pyschiatric hospitalization required while a safe disposition plan is enacted: Yes    Patient stabilized and ready for discharge from inpatient psychiatric unit: No        STAFF:   Christo Swan III, MD  Psychiatry

## 2024-08-21 VITALS
WEIGHT: 113.13 LBS | SYSTOLIC BLOOD PRESSURE: 117 MMHG | OXYGEN SATURATION: 98 % | HEART RATE: 58 BPM | TEMPERATURE: 97 F | BODY MASS INDEX: 19.31 KG/M2 | DIASTOLIC BLOOD PRESSURE: 66 MMHG | RESPIRATION RATE: 16 BRPM | HEIGHT: 64 IN

## 2024-08-21 PROCEDURE — S4991 NICOTINE PATCH NONLEGEND: HCPCS | Performed by: PSYCHIATRY & NEUROLOGY

## 2024-08-21 PROCEDURE — 25000003 PHARM REV CODE 250: Performed by: PSYCHIATRY & NEUROLOGY

## 2024-08-21 PROCEDURE — 90833 PSYTX W PT W E/M 30 MIN: CPT | Mod: ,,, | Performed by: STUDENT IN AN ORGANIZED HEALTH CARE EDUCATION/TRAINING PROGRAM

## 2024-08-21 PROCEDURE — 99239 HOSP IP/OBS DSCHRG MGMT >30: CPT | Mod: ,,, | Performed by: STUDENT IN AN ORGANIZED HEALTH CARE EDUCATION/TRAINING PROGRAM

## 2024-08-21 RX ORDER — QUETIAPINE FUMARATE 100 MG/1
100 TABLET, FILM COATED ORAL NIGHTLY
Qty: 30 TABLET | Refills: 0 | Status: SHIPPED | OUTPATIENT
Start: 2024-08-21 | End: 2025-08-21

## 2024-08-21 RX ORDER — IBUPROFEN 200 MG
1 TABLET ORAL DAILY
Qty: 30 PATCH | Refills: 0 | Status: SHIPPED | OUTPATIENT
Start: 2024-08-22

## 2024-08-21 RX ADMIN — NICOTINE 1 PATCH: 21 PATCH, EXTENDED RELEASE TRANSDERMAL at 08:08

## 2024-08-21 NOTE — PLAN OF CARE
31.9 Behavioral- Health Unit  Psychosocial History and Assessment  Progress Note      Patient Name: Enrike Tao YOB: 1990 SW: ALIX GRIMALDO, [facility] Date: 8/20/2024    Chief Complaint: psychosis    Consent:     Did the patient consent for an interview with the ? Yes    Did the patient consent for the  to contact family/friend/caregiver?   Yes  Name: Carlos De Jesus, Relationship: mother, and Contact: [phone]    Did the patient give consent for the  to inform family/friend/caregiver of his/her whereabouts or to discuss discharge planning? Yes    Source of Information: Face to face with patient and Face to face with family/friend/caregiver    Is information obtained from interviews considered reliable?   yes    Reason for Admission:     Active Hospital Problems    Diagnosis  POA    *Psychosis [F29]  Yes    Marijuana use [F12.90]  Yes      Resolved Hospital Problems   No resolved problems to display.       History of Present Illness - (Patient Perception):     Pt states that he saw this girl at the gas station and thought she was interested in him and states he had not spoken to a girl for 2 years and thought she was interested in him. Pt states he joined a group and found out that all communication was being shared. Reports he found out there are bad people there and he watched for a while but then told family regarding concerns for his own safety with members on the group wanting to kill him.   History of Present Illness - (Perception of Others):   Per Dr. Villafuerte Orgeron:    8/19/2024 12:53 PM   Enrike year old caucausian male with tri Tao   1990   [id number]                                          DATE OF ADMISSION: 8/19/2024  1:21 AM     SITE: [facility]     CURRENT LEGAL STATUS: PEC and/or CEC        HISTORY    CHIEF COMPLAINT   Enrike Tao is a 33 y.o. male with a past psychiatric history of  anxiety, depression, ADHD  currently admitted to  "the inpatient unit with the following chief complaint:  psychosis    HPI   The patient was seen and examined. The chart was reviewed.     The patient presented to the ER on 8/19/2024 .     The patient was medically cleared and admitted to the BHU.           Per ED MD:       Chief Complaint   Patient presents with    Psychiatric Evaluation       TO ED VIA [facility] WITH AN OPC FOR HALLUCINATIONS. PT STATES "SOMEONE IS AFTER ME, THEY POST IT ON FACEBOOK" PT DENIES SI/HI/AH/VH.       History of Present Illness  Enrike Tao is a 33 y.o. male that presents with psychosis today  Patient placed under OPC by her family for paranoia & psychosis  Patient expressed that people were coming after him earlier tonight  Patient states people have gotten hold of his Facebook post recently  He now states people are coming after him, hit man is after him        Per RN:       Pt cooperative with an animated affect. Pt reports he met a girl on Facebook and they would post in code where only they know what the other is taking about. They then joined a group and he found out their communication was being shared. The patient believes now that that group is bad and some are after him.            Per psych consult MD in ED:  Reports he met a girl on FaceBook, would post where only they know what the other is talking about, but then joined a group and found out that all communication was being shared. Reports he found out there are bad people there and he watched for a while but then told family regarding concerns for his own safety with members on the group wanting to kill him. Reports family see the sentence but don't understand the messages because they use specific words to communicate.  The girl he met won't admit to any of it. Not sure why they want to kill him.  Just playing it by ear for now, wants to protect himself and his 2 children.  Pt reports he has been target for assasination before as well.      Saw the girl at the gas " "station and believes she was identifying his car.  Started 1 week ago.  Sleeps during day, but stays up and watches cameras at night, 5-6 hours during day.   Drinks a lot of coffee, 2 large cups, used to have a lot of energy drinks, stopped for a while but in the last couple days restarted.   Uses marijuana daily until last week. Denies other drug use, hx benzo abuse.   Denies AVH, thought insertion or broadcasting, Pt reports will take medication if recommended and follow up.         Yuly Worley (cousin) [phone]   Saying people after him, the girl works at truck stop, never saw a message containing anything other than random comments about daily life.   Pt was paranoid about these unknown people knowing what has in his house, that  they hacked his phone camera.  Has been Staying with cousin past week except Friday night. He put tape on her camera due to paranoid delusions. First started on Monday when he told her that he needed to talk to her.  She knows he smokes marijuana, but she has also been told that he might be on methamphetamine.  She has his 2 children under her care at this time. He did hand the gun which he was holding in his waist to her .  the rifle was sitting on the floor at home where the kids also were.   Does not have confidence that he will comply with treatment recommendations.  Mostly because this has happened 1 year ago as well, where pt was paranoid delusional that a hitman was called on them.                  Psychiatric interview:  "I always go to get my coffee at the [employer] stop, one day the  looked at my truck, I felt weird about it," reports then starting communicating online on Facebook, "everything was being shared to multiple people... I started thinking I was being targeted to be robbed, I went to family but they didn't' see it... they seen I was stressed out, they called the  said I was crazy... I was being set up to be harmed or robbed, but they don't " "believe it."              Symptoms of Depression: diminished mood - No, loss of interest/anhedonia - No;      Changes in Sleep: trouble with initiation- No, maintenance, - No early morning awakening with inability to return to sleep - No, hypersomnolence - No     Suicidal- active/passive ideations - No, organized plans, future intentions - No     Homicidal ideations: active/passive ideations - No, organized plans, future intentions - No     Symptoms of psychosis: hallucinations - No, delusions - Yes, disorganized speech - No, disorganized behavior or abnormal motor behavior - No, or negative symptoms (diminshed emotional expression, avolition, anhedonia, alogia, asociality) - No, active phase symptoms >1 month - No, continuous signs of illness > 6 months - No, since onset of illness decreased level of functioning present - No     Symptoms of everardo or hypomania: elevated, expansive, or irritable mood with increased energy or activity - No; > 4 days - No,  >7 days - No; with inflated self-esteem or grandiosity - No, decreased need for sleep - No, increased rate of speech - No, FOI or racing thoughts - No, distractibility - No, increased goal directed activity or PMA - No, risky/disinhibited behavior - No     Symptoms of ZIYAD: excessive anxiety/worry/fear, more days than not, about numerous issues - No, ongoing for >6 months - No, difficult to control - No, with restlessness - No, fatigue - No, poor concentration - No, irritability - No, muscle tension - No, sleep disturbance - No; causes functionally impairing distress - No     Symptoms of Panic Disorder: recurrent panic attacks (palpitations/heart racing, sweating, shakiness, dyspnea, choking, chest pain/discomfort, Gi symptoms, dizzy/lightheadedness, hot/col flashes, paresthesias, derealization, fear of losing control or fear of dying or fear of "going crazy") - No, precipitated - No, un-precipitated - No, source of worry and/or behavioral changes secondary for 1 " month or longer- No, agoraphobia - No     Symptoms of PTSD: h/o trauma exposure - No; re-experiencing/intrusive symptoms - No, avoidant behavior - No, 2 or more negative alterations in cognition or mood - No, 2 or more hyperarousal symptoms - No; with dissociative symptoms - No, ongoing for 1 or more  months - No     Symptoms of OCD: obsessions (recurrent thoughts/urges/images; intrusive and/or unwanted; uses other thoughts/actions to suppress) - No; compulsions (repetitive behaviors used to lower distress/anxiety/obsessions) - No, time-consuming (over 1 hour per day) or cause significant distress/impairment - - No     Symptoms of Anorexia: restriction of caloric intake leading to significantly low body weight - No, intense fear of gaining weight or persistent behavior that interferes with weight gain even thought at a significantly low weight - No, disturbance in the way in which one's body weight or shape is experienced, undue influence of body weight or shape on self evaluation, or persistent lack of recognition of the seriousness of the current low body weight - No     Symptoms of Bulimia: recurrent episodes of binge eating (definitely larger amount  than what others would eat and lack of a sense of control over eating during episode) - No, recurrent inappropriate compensatory behaviors in order to prevent weight gain (fasting, medications, exercise, vomiting) - No, binges and compensatory behaviors both occur on average at least once a week for 3 months - No, self evaluations is unduly influenced by body shape/weight- - No           Psychotherapy:  Target symptoms: substance abuse, psychosis  Why chosen therapy is appropriate versus another modality: relevant to diagnosis  Outcome monitoring methods: self-report  Therapeutic intervention type: supportive psychotherapy  Topics discussed/themes: building skills sets for symptom management, symptom recognition, substance abuse  The patient's response to the  "intervention is accepting. The patient's progress toward treatment goals is fair.   Duration of intervention: 16 minutes.           PAST PSYCHIATRIC HISTORY  Previous Psychiatric Hospitalizations: Yes, x1 "I took some xanbars years ago, hanging around the wrong people, I was a minor"  Previous SI/HI: No,  Previous Suicide Attempts: No,   Previous Medication Trials: Yes,  Psychiatric Care (current & past): Yes,  History of Psychotherapy: Yes,  History of Violence: No,  History of sexual/physical abuse: No,     PAST MEDICAL & SURGICAL HISTORY        Past Medical History:   Diagnosis Date    ADHD (attention deficit hyperactivity disorder)      Anxiety      Depression              Past Surgical History:   Procedure Laterality Date    ANKLE SURGERY         left            Home Meds:   Prior to Admission medications    Not on File               Scheduled Meds:    nicotine  1 patch Transdermal Daily      PRN Meds:   Current Facility-Administered Medications:     acetaminophen, 650 mg, Oral, Q6H PRN    aluminum-magnesium hydroxide-simethicone, 30 mL, Oral, Q6H PRN    benzonatate, 100 mg, Oral, TID PRN    benztropine mesylate, 2 mg, Intramuscular, Q8H PRN    hydrOXYzine pamoate, 50 mg, Oral, Q6H PRN    loperamide, 2 mg, Oral, PRN    nicotine, 1 patch, Transdermal, Daily PRN    OLANZapine, 10 mg, Oral, Q8H PRN **AND** OLANZapine, 10 mg, Intramuscular, Q8H PRN    ondansetron, 4 mg, Oral, Q8H PRN    promethazine, 25 mg, Oral, Q6H PRN   Psychotherapeutics (From admission, onward)        Start     Stop Route Frequency Ordered     08/19/24 1134   OLANZapine tablet 10 mg  (Olanzapine PRN (</= 66 yo))        Placed in "And" Linked Group    -- Oral Every 8 hours PRN 08/19/24 1038     08/19/24 1134   OLANZapine injection 10 mg  (Olanzapine PRN (</= 66 yo))        Placed in "And" Linked Group    -- IM Every 8 hours PRN 08/19/24 1038                ALLERGIES        Review of patient's allergies indicates:   Allergen Reactions    " "Amoxicillin Hives         NEUROLOGIC HISTORY  Seizures: No  Head trauma: No     SOCIAL HISTORY:  Developmental/Childhood:Achieved all developmental milestones timely  Education: 8th  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 YES:      Mormon:Actively participates in organized Mormon  Recreational activities: "fishing, RC boats"     SUBSTANCE ABUSE HISTORY   Recreational Drugs: marijuana   Use of Alcohol: denied  Rehab History:no   Tobacco Use:yes     LEGAL HISTORY:   Past charges/incarcerations: YES: DWI     Pending charges:NO     FAMILY PSYCHIATRIC HISTORY          Family History   Problem Relation Name Age of Onset    Hypertension Father        Diabetes Father        Alcohol abuse Father        Cancer Maternal Grandmother        Cancer Maternal Grandfather        Hypertension Paternal Grandmother        Diabetes Paternal Grandmother        Hypertension Paternal Grandfather        Diabetes Paternal Grandfather             Denies         ROS  Review of Systems   Constitutional:  Negative for chills and fever.   HENT:  Negative for hearing loss.    Eyes:  Negative for blurred vision and double vision.   Respiratory:  Negative for shortness of breath.    Cardiovascular:  Negative for chest pain and palpitations.   Gastrointestinal:  Negative for constipation, diarrhea, nausea and vomiting.   Genitourinary:  Negative for dysuria.   Musculoskeletal:  Negative for back pain and neck pain.   Skin:  Negative for rash.   Neurological:  Negative for dizziness and headaches.   Endo/Heme/Allergies:  Negative for environmental allergies.            EXAMINATION     PHYSICAL EXAM  Reviewed note/exam by Misha Benitez MD from 08/18/24 at 2305     VITALS       Vitals:     08/19/24 0724   BP: (!) 103/55   Pulse: 61   Resp: 16   Temp: 97.5 °F (36.4 °C)    "      Body mass index is 18.66 kg/m².           PAIN  0/10  Subjective report of pain matches objective signs and symptoms: Yes     LABORATORY DATA   Recent Results         Recent Results (from the past 72 hour(s))   Urinalysis, Reflex to Urine Culture Urine, Clean Catch     Collection Time: 08/18/24  8:59 PM     Specimen: Urine   Result Value Ref Range     Specimen UA Urine, Clean Catch       Color, UA Yellow Yellow, Straw, Sandie     Appearance, UA Clear Clear     pH, UA 6.0 5.0 - 8.0     Specific Gravity, UA 1.025 1.005 - 1.030     Protein, UA 1+ (A) Negative     Glucose, UA Negative Negative     Ketones, UA Trace (A) Negative     Bilirubin (UA) 1+ (A) Negative     Occult Blood UA Negative Negative     Nitrite, UA Negative Negative     Urobilinogen, UA 1.0 <2.0 EU/dL     Leukocytes, UA Negative Negative   Drug screen panel, in-house     Collection Time: 08/18/24  8:59 PM   Result Value Ref Range     Benzodiazepines Negative Negative     Methadone metabolites Negative Negative     Cocaine (Metab.) Negative Negative     Opiate Scrn, Ur Negative Negative     Barbiturate Screen, Ur Negative Negative     Amphetamine Screen, Ur Negative Negative     THC Presumptive Positive (A) Negative     Phencyclidine Negative Negative     Creatinine, Urine >450.0 (H) 23.0 - 375.0 mg/dL     Toxicology Information SEE COMMENT     Urinalysis Microscopic     Collection Time: 08/18/24  8:59 PM   Result Value Ref Range     RBC, UA 1 0 - 4 /hpf     WBC, UA 5 0 - 5 /hpf     Bacteria Occasional None-Occ /hpf     Squam Epithel, UA 1 /hpf     Hyaline Casts, UA 3 (A) 0-1/lpf /lpf     Ca Oxalate Deandra, UA Many (A) None-Moderate     Microscopic Comment SEE COMMENT     Comprehensive Metabolic Panel     Collection Time: 08/18/24  9:16 PM   Result Value Ref Range     Sodium 140 136 - 145 mmol/L     Potassium 3.7 3.5 - 5.1 mmol/L     Chloride 102 95 - 110 mmol/L     CO2 25 23 - 29 mmol/L     Glucose 110 70 - 110 mg/dL     BUN 10 6 - 20 mg/dL      "Creatinine 1.0 0.5 - 1.4 mg/dL     Calcium 10.0 8.7 - 10.5 mg/dL     Total Protein 7.6 6.0 - 8.4 g/dL     Albumin 4.7 3.5 - 5.2 g/dL     Total Bilirubin 0.7 0.1 - 1.0 mg/dL     Alkaline Phosphatase 62 55 - 135 U/L     AST 13 10 - 40 U/L     ALT 14 10 - 44 U/L     eGFR >60 >60 mL/min/1.73 m^2     Anion Gap 13 8 - 16 mmol/L   CBC Auto Differential     Collection Time: 08/18/24  9:16 PM   Result Value Ref Range     WBC 11.80 3.90 - 12.70 K/uL     RBC 5.34 4.60 - 6.20 M/uL     Hemoglobin 17.1 14.0 - 18.0 g/dL     Hematocrit 47.1 40.0 - 54.0 %     MCV 88 82 - 98 fL     MCH 32.0 (H) 27.0 - 31.0 pg     MCHC 36.3 (H) 32.0 - 36.0 g/dL     RDW 11.8 11.5 - 14.5 %     Platelets 222 150 - 450 K/uL     MPV 10.2 9.2 - 12.9 fL     Immature Granulocytes 0.3 0.0 - 0.5 %     Gran # (ANC) 7.8 (H) 1.8 - 7.7 K/uL     Immature Grans (Abs) 0.04 0.00 - 0.04 K/uL     Lymph # 3.0 1.0 - 4.8 K/uL     Mono # 0.8 0.3 - 1.0 K/uL     Eos # 0.1 0.0 - 0.5 K/uL     Baso # 0.05 0.00 - 0.20 K/uL     nRBC 0 0 /100 WBC     Gran % 66.1 38.0 - 73.0 %     Lymph % 25.7 18.0 - 48.0 %     Mono % 6.5 4.0 - 15.0 %     Eosinophil % 1.0 0.0 - 8.0 %     Basophil % 0.4 0.0 - 1.9 %     Differential Method Automated     Acetaminophen Level     Collection Time: 08/18/24  9:16 PM   Result Value Ref Range     Acetaminophen (Tylenol), Serum <3.0 (L) 10.0 - 20.0 ug/mL   TSH     Collection Time: 08/18/24  9:16 PM   Result Value Ref Range     TSH 0.729 0.400 - 4.000 uIU/mL   Salicylate Level     Collection Time: 08/18/24  9:16 PM   Result Value Ref Range     Salicylate Lvl <5.0 (L) 15.0 - 30.0 mg/dL   Ethanol     Collection Time: 08/18/24  9:16 PM   Result Value Ref Range     Alcohol, Serum <10 <10 mg/dL         No results found for: "PHENYTOIN", "PHENOBARB", "VALPROATE", "CBMZ"           CONSTITUTIONAL  General Appearance: unremarkable, age appropriate     MUSCULOSKELETAL  Muscle Strength and Tone:no tremor, no tic  Abnormal Involuntary Movements: No  Gait and Station: " non-ataxic     PSYCHIATRIC   Level of Consciousness: awake and alert   Orientation: person, place, and situation  Grooming: Hospital garb and Well groomed  Psychomotor Behavior: normal, cooperative  Speech: normal tone, normal rate, normal pitch, normal volume  Language: grossly intact  Mood: fine  Affect: Consistent with mood  Thought Process: linear, logical  Associations: intact   Thought Content: +delusions, denies SI, and denies HI  Perceptions: denies AH and denies  VH  Memory: Able to recall past events, Remote intact, and Recent intact  Attention:Attends to interview without distraction  Fund of Knowledge: Aware of current events and Vocabulary appropriate   Estimate if Intelligence:  Average based on work/education history, vocabulary and mental status exam  Insight: limited awareness of illness  Judgment: behavior is adequate to circumstances        PSYCHOSOCIAL     PSYCHOSOCIAL STRESSORS   family     FUNCTIONING RELATIONSHIPS   good support system     STRENGTHS AND LIABILITIES   Strength: Patient accepts guidance/feedback, Strength: Patient is expressive/articulate., Strength: Patient is intelligent., Strength: Patient is physically healthy., Liability: Patient is impulsive., Liability: Patient lacks coping skills.     Is the patient aware of the biomedical complications associated with substance abuse and mental illness? yes     Does the patient have an Advance Directive for Mental Health treatment? no  (If yes, inform patient to bring copy.)           MEDICAL DECISION MAKING          ASSESSMENT         Unspecified psychosis  Cannabis abuse  Psychosocial stressors  Nicotine dependence           PROBLEM LIST AND MANAGEMENT PLANS        Unspecified psychosis  - start seroquel 50 mg PO qhs  - pt counseled  - follow up with outpatient mental health providers after discharge for medication management and psychotherapy     Cannabis abuse  - pt counseled  - follow up with outpatient mental health providers  "after discharge for medication management and psychotherapy     Psychosocial stressors  - pt counseled  - SW consulted for assistance with additional resources      Nicotine dependence  - start nicotine patch 14 mg PO qd PRN            PRESCRIPTION DRUG MANAGEMENT  Compliance: unknown  Side Effects: no  Regimen Adjustments: see above     Discussed diagnosis, risks and benefits of proposed treatment vs alternative treatments vs no treatment, potential side effects of these treatments and the inherent unpredictability of treatment. The patient expresses understanding of the above and displays the capacity to agree with this treatment given said understanding. Patient also agrees that, currently, the benefits outweigh the risks and would like to pursue/continue treatment at this time.     Any medications being used off-label were discussed with the patient inclusive of the evidence base for the use of the medications and consent was obtained for the off-label use of the medication.            DIAGNOSTIC TESTING  Labs reviewed with patient; follow up pending labs     Disposition:  -Will attempt to obtain outside psychiatric records if available  -SW to assist with aftercare planning and collateral  -Once stable discharge home with outpatient follow up care and/or rehab  -Continue inpatient treatment under a PEC and/or CEC for danger to self/ danger to others/grave disability as evident by gravely disabled  Present biopsychosocial functioning:   Pt is a 33 year old  male with chief complaints of psychosis. 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 Patient verbalized his main goal, "I want to feel better." Patient states "I want the  to investigate this and to get to the bottom of it."  Pt states his family sees the sentence but don't understand the messages because they " "use specific words to communicate. The girl he met won't admit to any of it. Not sure why they want to kill him. Cousin states pt was paranoid about these unknown people knowing what has in his house, that they hacked his phone camera. Has been Staying with cousin past week except Friday night. He put tape on her camera due to paranoid delusions. First started on Monday when he told her that he needed to talk to her. She knows he smokes marijuana, but she has also been told that he might be on methamphetamine.   Pt states he does not have confidence that he will comply with treatment recommendations.     Past biopsychosocial functioning:   Pt has past psychiatric history of depression, ADHD. Pt does not have confidence that he will comply with treatment recommendations. Mostly because this has happened 1 year ago as well, where pt was paranoid delusional that a hitman was called on them. Pt states he had past psychiatric hospitalization due to him taking xanbars years ago, hanging around the wrong people, I was a minor"  Pt has previous history of psychotherapy and medication trials.     Family and Marital/Relationship History:     Significant Other/Partner Relationships:  Single:      Family Relationships: Intact. Pt states he prefers not to state his children's names at this time.      Childhood History:     [place]     Who raised the patient? Biological mother       How does patient describe their childhood? Pt states good      Who is patient's primary support person?  Corrina De Jesus/mother      Culture and Yazdanism:     Yazdanism: Scientologist    How strong of a role does Mandaen and spirituality play in patient's life?   Spiritual without formal affiliations    Taoist or spiritual concerns regarding treatment: not applicable     History of Abuse:   History of Abuse: Denies      Outcome: n/a    Psychiatric and Medical History:     History of psychiatric illness or treatment: " none    Medical history:   Past Medical History:   Diagnosis Date    ADHD (attention deficit hyperactivity disorder)     Anxiety     Depression        Substance Abuse History:     Alcohol - (Patient Perspective):   Social History     Substance and Sexual Activity   Alcohol Use No       Alcohol - (Collateral Perspective):    Mother states pt does not endorse in alcohol    Drugs - (Patient Perspective):   Social History     Substance and Sexual Activity   Drug Use No       Drugs - (Collateral Perspective):    Mother states she knows that he smokes mariajuana, but I don's know how much or how often.     Additional Comments:   Pt UDS was positive for marijuana upon admit.    Education:     Currently Enrolled?  8th grade    Special Education? No    Interested in Completing Education/GED: No    Employment and Financial:     Currently employed? 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 Yes    Who manages finances/personal affairs? self      Service:     Crocker? no    Combat Service? No     Community Resources:     Describe present use of community resources:  Medicaid     Identify previously used community resources   (Include previous mental health treatment - outpatient and inpatient):  Medicaid    Environmental:     Current living situation:Lives in home    Social Evaluation:     Patient Assets: General fund of knowledge, Work skills, Communicable skills, and Financial means    Patient Limitations: none    High risk psychosocial issues that may impact discharge planning:   psychosis    Recommendations:     Anticipated discharge plan:   outpatient follow up: [facility]    High risk issues requiring early treatment planning and immediate intervention:   psychosis    Community resources needed for discharge planning:  aftercare treatment sources    Anticipated social work role(s) in treatment and  discharge planning:   PLPC met with pt 1:1 to complete psychosocial assessment, obtain ASHLEY for collateral, and identify appropriate aftercare referrals and resources. Pt was cooperative with session. PLPC will assist pt with a safe discharge disposition and referrals to follow-up outpatient medication management and therapy to treat mental illness.

## 2024-08-21 NOTE — NURSING
Patient sleeping at this time, slept 5 hours with one awakening. Respirations even and unlabored. No distress noted. Q15 min safety check ongoing. Safety precautions remain in place.

## 2024-08-21 NOTE — PSYCH
"Meditation/Education:      Listening to your mind & body: Meditation Exercise    Patient Response:      "Patient attended session and gave positive feedback and found the session helpful".              "

## 2024-08-21 NOTE — CARE UPDATE
FACE SHEET     Enrike Tao MRN:[id number] ([facility]#[id number]) (33 y.o. M) (Adm: 24)  [facility] [id number]  Patient Demographics    Patient Name  Enrike Tao Legal Sex  Male          Age  1990 (33 y.o.) N   Address (Permanent)  [address]  [place] [address]    Patient Demographics    Address (Permanent)  [address]  [place] [address] E-mail Address  [email or web address]    PCP and Center    Primary Care Provider  Primary Doctor No Center  [facility]    Patient Contacts    Name Relation Home Work Mobile  Corrina De Jesus Mother   [phone]  Guerline Tao   [phone]    Documents on File     Status Date Received Description  Documents for the Patient  Notice of Privacy Pract Ackn Received 08/15/12   Insurance Documents Received () 14   Patient ID Received 08/15/12   Advance Directive Acknowledgement Received 24   Clinic Authorization Received () 08/15/12   Provider Based Acknowledgement     Clinic Authorization Received () 14   Plain Language Summary English Acknowledged () 20   Plain Language Summary English Acknowledged () 21 Fin asst info  Plain Language Summary English Acknowledged () 22   Advance Directive Acknowledgement Received 22   Notice of Privacy Pract Ackn LIYAH     Involvement In Care     Outside Records Clinic Received 24   Plain Language Summary English Acknowledged () 24   [facility] Contracted Facility Disclosure Unable to Obtain 24   [facility] Not Contracted Facility Disclosure     Patient Rights and Responsibilities     Insurance Documents     [facility] Contracted Facility Disclosure Signed 24 Facilities/Self  Documents for the Encounter  Medicare Lifetime Reserve Form     Important Medicare Message Printed at Discharge     Advance Beneficiary Notice     Hospital Authorization Unable to Obtain 24   Flowsheets  Nursing Received 08/19/24   PDF Report   Custom Document, All Review Information  Clinical References Attachment   Quitting Smoking (English)  PDF Report   All Review Information, Psych Certification Report  HIM Release of Information Output  08/19/24 Document (8/19/2024  1:46 PM CDT)    Admission Information    Current Information    Attending Provider Admitting Provider Admission Type Admission Status  Andrew Cantu MD Blanchard, Michael J., MD Urgent Confirmed Admission        Admission Date/Time Discharge Date Hospital Service Auth/Cert Status  08/19/24  0121  Psychiatry Incomplete        Hospital Area Unit Room/Bed   [facility] 210/210 D            Admission    Complaint  HALLUCINATIONS    Hospital Account    Name Acct ID Class Status Primary Coverage  Enrike Tao [id number] IP- Psych Open MEDICAID - [employer]       Guarantor Account (for Hospital Account #[id number])    Name Relation to Pt Service Area Active? Acct Type  Enrike Tao Self [facility] Yes Behavioral Health  Address Phone    [address]  [place] [address]          Coverage Information (for Hospital Account #[id number])    F/O Payor/Plan Precert #  MEDICAID/[employer] [id number]  Subscriber Subscriber #  Enrike Tao [id number]  Address Phone  [address] [address]  [place] [address]

## 2024-08-21 NOTE — PSYCH
[facility] discussed with pt on collateral consent.[facility] met with Corrina De Jesus/mother in person.     Reason for admission- describe what happened?    Mother states she is not sure as to what happened, but only knows that he told her that he met this girl at the gas station up the street saying that she was texting him in code and he thought that he was being followed and that people are out to kill him from a group he joined.          Prior treatment places and dates-doctor name and location  Reason for prior treatment- same or different  How long has patient had problem(s)?  Mother states he did have an incident 1 year ago as well where pt was paranoid and delusional that someone was called on to kill him.     Substance abuse- what , how long, how much, how often?   Mother states she knows he smokes marijuana, but could not tell you for how ling, how much, or how often.     Legal Issues- Current charges, type of offense, probation or parole?    Not at all      History of suicide attempts- when and what methods, did they require medical attention?    No    Alcohol Use-  What preference of alcohol, how much, how long, how often?   no    History of violence-describe behaviors and triggers    He has no history of violence  Any guns or weapons in the home? If yes, recommend that these be secured.     He did have a gun, but his cousin took it out his home and it is at her home. I will make sure that all sharp objects are secured within the home    What is patients baseline behavior/mood- how well do they know if patient is doing well?    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       What helps the patient stay well?  Internal/external coping strategies ( attending meetings, going to groups, taking medications, spending time with family ( etc)?   Being around his family and taking care of his children. He is a good father to his children    Discharge plan:    Where will the patient live upon discharge?    He will  be going back to his home.  Who else in the home?  Just him and his two children    Will someone be able to pick the patient up when discharged?   I will come to pick him up tomorrow.

## 2024-08-21 NOTE — PSYCH
[facility]S  DISCHARGE INSTRUCTIONS  Enrike Tao MRN: [id number]     Psychosis   8/19/2024 - 8/21/2024   [facility]   Instructions    No changes were made to your medications.  Your Next Steps (Patient should check each box as tasks are completed)   Read    Read these attachments  · Quitting Smoking (English)  What's Next  You currently have no upcoming appointments scheduled.    All Component Based Labs   08/20/24  0620  08/18/24 2116 08/18/24 2059    Benzodiazepines     Negative    Methadone metabolites     Negative    Phencyclidine     Negative    Acetaminophen Level   <3.0 Low       Albumin   4.7      Alcohol, Serum   <10      ALP   62      ALT   14      Amphetamines, Urine     Negative    Anion Gap   13      Appearance, UA     Clear    AST   13      Bacteria, UA     Occasional    Barbituates, Urine     Negative    Baso #   0.05      Basophil %   0.4      Bilirubin (UA)     1+ Abnormal     BILIRUBIN TOTAL   0.7      BUN   10      Ca Oxalate Deandra, UA     Many Abnormal     Calcium   10.0      Chloride   102      Cholesterol Total 161        CO2   25      Cocaine, Urine     Negative    Color, UA     Yellow    Creatinine   1.0      Urine Creatinine     >450.0 High     Differential Method   Automated      eGFR   >60      Eos #   0.1      Eos %   1.0      Estimated Avg Glucose 97        Glucose   110      Glucose, UA     Negative    Gran # (ANC)   7.8 High       Gran %   66.1      HDL 45        HDL/Cholesterol Ratio 28.0        Hematocrit   47.1      Hemoglobin   17.1      Hemoglobin A1C External 5.0        Hyaline Casts, UA     3 Abnormal     Immature Grans (Abs)   0.04      Immature Granulocytes   0.3      Ketones, UA     Trace Abnormal     LDL Cholesterol 99.8        Leukocyte Esterase, UA     Negative    Lymph #   3.0      Lymph %   25.7      MCH   32.0 High       MCHC   36.3 High       MCV   88      Microscopic Comment     SEE COMMENT    Mono #   0.8      Mono %   6.5      MPV   10.2      NITRITE  "UA     Negative    Non-HDL Cholesterol 116        nRBC   0      Blood, UA     Negative    Opiates, Urine     Negative    pH, UA     6.0    Platelet Count   222      Potassium   3.7      PROTEIN TOTAL   7.6      Protein, UA     1+ Abnormal     RBC   5.34      RBC, UA     1    RDW   11.8      Salicylate Level   <5.0 Low       Sodium   140      Spec Grav UA     1.025    Specimen UA     Urine, Clean Catch    Squam Epithel, UA     1    Marijuana (THC) Metabolite     Presumptive Positive Abnormal     Total Cholesterol/HDL Ratio 3.6        Toxicology Information     SEE COMMENT    Triglycerides 81        TSH   0.729      UROBILINOGEN UA     1.0    WBC, UA     5    WBC   11.80                  Your care is important to us. If your provider recommended a follow-up appointment or test, we are happy to help you coordinate your recommended care. It is important that you complete your recommended follow-up.  If you need help scheduling, please call [phone]. Appointments can also be made online through the patient portal.      While scheduling and attending your appointments is your responsibility, our goal is to support and empower you throughout that process.         Your Diagnoses at Discharge   Psychosis  Marijuana use  Reason for Admission  Per PEC "33 jordi old male that presents with psychosis today. Patient placed under OPC by family for paranoia and psychosis. Patient expressed that that people were coming after him earlier tonight.  You are allergic to the following  You are allergic to the following  Allergen Reactions  Amoxicillin Hives    Your Latest Vitals    Blood Pressure 117/66       BMI 19.41       Weight 113 lb 1.5 oz       Height 5' 4"       Temperature (Tympanic) 97.1 °F       Pulse 58       Respiration 16       Oxygen Saturation 98%       BSA 1.52 m²  Treatment Team  Chat With All Treatment Team   Provider Role Specialty   Andrew Cantu MD  Attending Psychiatry   Alina Mehta PA-C "  Consulting Physician Hospitalist   Andrew Cantu MD  Admitting Psychiatry    Recent Lab Values   Include labs without results  Most Recent Result    A1C  5.0  08/20 0620  Most Recent 8 Results  Show dates as rows  Blood Glucose and Lipid Panel Labs   Include labs without results  Most Recent Result from Past 365 Days    Cholesterol  161  08/20 0620  Triglycerides  81  08/20 0620  HDL Cholesterol  45  08/20 0620  LDL Cholesterol  99.8  08/20 0620  HDL/Cholesterol Ratio  28.0  08/20 0620  Total Cholesterol/HDL Ratio  3.6  08/20 0620  Non-HDL Cholesterol  116  08/20 0620  Most Recent 8 Results  Show dates as rows  Pending Labs/Studies  You have no pending labs/studies.  Contact Information  Call [phone] (anytime, 7 days a week) to:  ? Report concerns regarding hospital stay  ? Ask questions about your hospital stay and home care plan  ? Get new or updated results of your lab tests and other procedures  [facility]  [facility] Nurse Care Line - 24/7 Assistance  Unless otherwise directed by your provider, please contact [facility], our nurse care line that is available for 24/7 assistance. Please refer to the Patient Instructions section of your After Visit Summary for specific instructions from your physician.     Registered nurses in the [facility] Center provide appointment scheduling, clinical advisement, health education, and other advisory services.  Call: [phone] (toll free).  Advance Directives  An advance directive is a document which, in the event you are no longer able to make decisions for yourself, tells your healthcare team what kind of treatment you do or do not want to receive, or who you would like to make those decisions for you.  If you do not currently have an advance directive, [facility] encourages you to create one.  For more information call:  [phone]), [phone] ([phone]),  or log on to [email or web address].  Advance Directive  "Status  Flowsheet Row Most Recent Value  Advance Directive Status Patient does not have Advance Directive, declines information.    Behavioral Health Safety Plan     Step 1: Warning Signs:  1.   Pt stated" I get anxious."  2.   Pt stated" I get irritable."  3.   Pt stated" I get frustrated."     Step 2: Internal coping strategies - Things I can do to take my mind off my problems without contacting another person:  1.   Pt stated" I go fishing."  2.   Pt stated" I go for a walk."  3.   Pt stated"I like to go and do my work."     Step 3: People and social settings that provide distraction:  1.   Name: Corrina De Jesus/mother Phone: [phone]  2.   Name: Guerline Tao /Sister Phone: [phone]  3.   Place: Pt stated" I go visit her at her home."  4.   Place: Pt stated" I go and visit her at her home."     Step 4: People whom I can ask for help:  1.   Name: Guerline Tao /Sister Phone: [phone]  2.   Name: Corrina De Jesus/mother Phone: [phone]  3.   Name: Janee Lomeli/father Phone: In cell phone     Step 5: Professionals or agencies I can contact during a crisis:  1.   Clinician Name: [facility] Phone: [phone]    Clinician Pager or Emergency Contact #: [phone]       2.   Clinician Name: [facility] Phone: [phone]    Clinician Pager or Emergency Contact #: [phone]       3.   Suicide Prevention Lifeline: 988 or 5-005-932-QWYX (2804)       4.   Local Emergency Service: [facility]    Emergency Services Address: [address] [place] [address], [place] [address]    Emergency Services Phone: 911,-743 [place] Crisis Line, [place] Line, [place] Crisis and [place] Hotline     Step 6: Making the environment safer:  1.   Pt stated" To not smoke marijuana."  2. Pt stated" My children."       Used with permission from MARIA G Kurtz & PADMAJA Melgar. (2011). Safety planning intervention: A brief intervention to mitigate suicide risk. [facility] Behavioral [facility]. 19, " [phone]           Smoking Cessation  If you would like to quit smoking:  · You may be eligible for free services if you are a [place] or [place] resident Call [facility] at (066) 912-3665.  · Contact us via email: [email or web address]  · View our website for more information: [email or web address]  Language Assistance Services  ATTENTION: Language assistance services are available, free of charge. Please call [phone].       ATENCIÓN: Si habla español, tiene a villalobos disposición servicios gratuitos de asistencia lingüística. Llame al [phone].     [facility] Ý: N?u b?n nói Ti?ng Vi?t, có các d?ch v? h? tr? ngôn ng? mi?n phí dành cho b?n. G?i s? [phone].  National Suicide Prevention Lifeline  If you or someone you know is thinking about suicide, call the National Suicide Prevention Lifeline using the 3 digit phone number of 812 or 4-481-246-GXYE (4725).  The lifeline is free, confidential and always available.  Help a loved one, a friend or yourself.  www.suicidepreventionlifeline.org     Medication list  You have not been prescribed any medications.

## 2024-08-21 NOTE — NURSING
"Discharge instructions on medication, self care, suicide crisis hotline, and scheduled follow up appointment with the local mental health clinic. Patient voiced understanding of all discharge teachings. Patient denies SI/HI. Patient denies distress and reports " I am feeling so much better".  Patient is awaiting for transportation per discharge orders.   "

## 2024-08-21 NOTE — PLAN OF CARE
Problem: Adult Behavioral Health Plan of Care  Goal: Optimized Coping Skills in Response to Life Stressors  Intervention: Promote Effective Coping Strategies  Flowsheets (Taken 8/20/2024 0110)  Supportive Measures:   active listening utilized   counseling provided   self-reflection promoted   verbalization of feelings encouraged    Process Group        Behavior:  PLPC met with pt individually.  Pt attentive and engaged.      Intervention:     Process discussion on symptoms of indication of processing thoughts with discharge planning. Patients were encouraged to identify ways to cope with and reflect on what are the plans when they are discharged and what coping skills did they learn while in the hospital?        Response:  Pt affect calm  with good mood. Pt states he learned that he has to ask for help more when needed. He can not do everything alone and has to learn to let his family help him.    Plan:  Staff will continue to assess and obtain collaterals as needed.  Staff will coordinate discharge to home with residential treatment when deemed stable.
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
## 2024-08-21 NOTE — PROGRESS NOTES
08/21/24 1000   [facility] Therapy   Group Name Therapeutic Recreation   Specific Interventions Cognitive Stimulation Training   Participation Level Appropriate   Participation Quality Cooperative;Social   Insight/Motivation Applies New Skills;Good   Affect/Mood Display Appropriate   Cognition Alert   Psychomotor WNL     Patient presents calm, cooperative, reports a good mood. Patient filled out a focus on the positive skilled worksheet and states he is looking forward to going home, misses his kids and enjoys his cars, boats and trucks.

## 2024-08-21 NOTE — NURSING
Family member is here for transportation to home. Patient received all personal items, prescriptions, and discharge papers.  Patient voiced understanding of all AVS discharge information. Patient denies distress, denies SI/HI. NAD noted. Patient escorted by "[employer] downstairs for family member to transport to home.   NAD noted.

## 2024-08-22 NOTE — PSYCH
The patient has transitioned to outpatient treatment at [facility], [phone], [phone],[address] [place] [address]. Instructions: An appointment has been scheduled on September 9, 2024 8 am. While being treated the patient will receive tobacco cessation, medication management and counseling. The AVS was faxed on 08/21/2024, at   8:53 am.